# Patient Record
Sex: FEMALE | Race: WHITE | NOT HISPANIC OR LATINO | URBAN - METROPOLITAN AREA
[De-identification: names, ages, dates, MRNs, and addresses within clinical notes are randomized per-mention and may not be internally consistent; named-entity substitution may affect disease eponyms.]

---

## 2017-01-27 ENCOUNTER — OFFICE (OUTPATIENT)
Dept: URBAN - METROPOLITAN AREA CLINIC 64 | Facility: CLINIC | Age: 62
End: 2017-01-27

## 2017-01-27 VITALS
WEIGHT: 158 LBS | HEIGHT: 61 IN | DIASTOLIC BLOOD PRESSURE: 71 MMHG | HEART RATE: 71 BPM | SYSTOLIC BLOOD PRESSURE: 123 MMHG

## 2017-01-27 DIAGNOSIS — K21.9 GASTRO-ESOPHAGEAL REFLUX DISEASE WITHOUT ESOPHAGITIS: ICD-10-CM

## 2017-01-27 DIAGNOSIS — R19.7 DIARRHEA, UNSPECIFIED: ICD-10-CM

## 2017-01-27 PROCEDURE — 99213 OFFICE O/P EST LOW 20 MIN: CPT | Performed by: INTERNAL MEDICINE

## 2017-01-27 RX ORDER — DEXLANSOPRAZOLE 60 MG/1
CAPSULE, DELAYED RELEASE ORAL
Qty: 30 | Refills: 2 | Status: ACTIVE

## 2017-04-25 ENCOUNTER — HOSPITAL ENCOUNTER (OUTPATIENT)
Dept: FAMILY MEDICINE CLINIC | Facility: CLINIC | Age: 62
Setting detail: SPECIMEN
Discharge: HOME | End: 2017-04-25
Attending: FAMILY MEDICINE | Admitting: FAMILY MEDICINE

## 2017-04-25 LAB
ALBUMIN SERPL-MCNC: 4.1 G/DL (ref 3.5–4.8)
ALBUMIN/GLOB SERPL: 1.3 {RATIO} (ref 1–1.7)
ALP SERPL-CCNC: 78 IU/L (ref 32–91)
ALT SERPL-CCNC: 26 IU/L (ref 14–54)
ANION GAP SERPL CALC-SCNC: 15.6 MMOL/L (ref 10–20)
AST SERPL-CCNC: 24 IU/L (ref 15–41)
BASOPHILS # BLD AUTO: 0 10*3/UL (ref 0–0.2)
BASOPHILS NFR BLD AUTO: 0 % (ref 0–2)
BILIRUB SERPL-MCNC: 0.6 MG/DL (ref 0.3–1.2)
BUN SERPL-MCNC: 17 MG/DL (ref 8–20)
BUN/CREAT SERPL: 21.3 (ref 5.4–26.2)
CALCIUM SERPL-MCNC: 9.6 MG/DL (ref 8.9–10.3)
CHLORIDE SERPL-SCNC: 106 MMOL/L (ref 101–111)
CHOLEST SERPL-MCNC: 244 MG/DL
CHOLEST/HDLC SERPL: 4.3 {RATIO}
CONV CO2: 26 MMOL/L (ref 22–32)
CONV LDL CHOLESTEROL DIRECT: 136 MG/DL (ref 0–100)
CONV TOTAL PROTEIN: 7.3 G/DL (ref 6.1–7.9)
CREAT UR-MCNC: 0.8 MG/DL (ref 0.4–1)
DIFFERENTIAL METHOD BLD: (no result)
EOSINOPHIL # BLD AUTO: 0.3 10*3/UL (ref 0–0.3)
EOSINOPHIL # BLD AUTO: 4 % (ref 0–3)
ERYTHROCYTE [DISTWIDTH] IN BLOOD BY AUTOMATED COUNT: 17.6 % (ref 11.5–14.5)
GLOBULIN UR ELPH-MCNC: 3.2 G/DL (ref 2.5–3.8)
GLUCOSE SERPL-MCNC: 93 MG/DL (ref 65–99)
HCT VFR BLD AUTO: 45.4 % (ref 35–49)
HDLC SERPL-MCNC: 57 MG/DL
HGB BLD-MCNC: 14.9 G/DL (ref 12–15)
LDLC/HDLC SERPL: 2.4 {RATIO}
LIPID INTERPRETATION: ABNORMAL
LYMPHOCYTES # BLD AUTO: 2.8 10*3/UL (ref 0.8–4.8)
LYMPHOCYTES NFR BLD AUTO: 36 % (ref 18–42)
MCH RBC QN AUTO: 27.2 PG (ref 26–32)
MCHC RBC AUTO-ENTMCNC: 32.9 G/DL (ref 32–36)
MCV RBC AUTO: 82.7 FL (ref 80–94)
MONOCYTES # BLD AUTO: 0.6 10*3/UL (ref 0.1–1.3)
MONOCYTES NFR BLD AUTO: 8 % (ref 2–11)
NEUTROPHILS # BLD AUTO: 4.1 10*3/UL (ref 2.3–8.6)
NEUTROPHILS NFR BLD AUTO: 52 % (ref 50–75)
NRBC BLD AUTO-RTO: 0 /100{WBCS}
NRBC/RBC NFR BLD MANUAL: 0 10*3/UL
PLATELET # BLD AUTO: 320 10*3/UL (ref 150–450)
PMV BLD AUTO: 7.2 FL (ref 7.4–10.4)
POTASSIUM SERPL-SCNC: 4.6 MMOL/L (ref 3.6–5.1)
RBC # BLD AUTO: 5.49 10*6/UL (ref 4–5.4)
SODIUM SERPL-SCNC: 143 MMOL/L (ref 136–144)
TRIGL SERPL-MCNC: 280 MG/DL
TSH SERPL-ACNC: 4.51 UIU/ML (ref 0.34–5.6)
VIT B12 SERPL-MCNC: 213 PG/ML (ref 180–914)
VLDLC SERPL CALC-MCNC: 51.5 MG/DL
WBC # BLD AUTO: 7.8 10*3/UL (ref 4.5–11.5)

## 2017-05-26 ENCOUNTER — HOSPITAL ENCOUNTER (OUTPATIENT)
Dept: MAMMOGRAPHY | Facility: HOSPITAL | Age: 62
Discharge: HOME | End: 2017-05-26
Attending: FAMILY MEDICINE | Admitting: FAMILY MEDICINE

## 2017-10-03 ENCOUNTER — HOSPITAL ENCOUNTER (OUTPATIENT)
Dept: FAMILY MEDICINE CLINIC | Facility: CLINIC | Age: 62
Setting detail: SPECIMEN
Discharge: HOME | End: 2017-10-03
Attending: FAMILY MEDICINE | Admitting: FAMILY MEDICINE

## 2017-10-03 LAB
ALBUMIN SERPL-MCNC: 4.1 G/DL (ref 3.5–4.8)
ALBUMIN/GLOB SERPL: 1.5 {RATIO} (ref 1–1.7)
ALP SERPL-CCNC: 82 IU/L (ref 32–91)
ALT SERPL-CCNC: 26 IU/L (ref 14–54)
ANION GAP SERPL CALC-SCNC: 12.2 MMOL/L (ref 10–20)
AST SERPL-CCNC: 27 IU/L (ref 15–41)
BILIRUB SERPL-MCNC: 0.2 MG/DL (ref 0.3–1.2)
BUN SERPL-MCNC: 10 MG/DL (ref 8–20)
BUN/CREAT SERPL: 11.1 (ref 5.4–26.2)
CALCIUM SERPL-MCNC: 9.1 MG/DL (ref 8.9–10.3)
CHLORIDE SERPL-SCNC: 109 MMOL/L (ref 101–111)
CHOLEST SERPL-MCNC: 170 MG/DL
CHOLEST/HDLC SERPL: 4.2 {RATIO}
CONV CO2: 26 MMOL/L (ref 22–32)
CONV LDL CHOLESTEROL DIRECT: 71 MG/DL (ref 0–100)
CONV TOTAL PROTEIN: 6.9 G/DL (ref 6.1–7.9)
CREAT UR-MCNC: 0.9 MG/DL (ref 0.4–1)
GLOBULIN UR ELPH-MCNC: 2.8 G/DL (ref 2.5–3.8)
GLUCOSE SERPL-MCNC: 104 MG/DL (ref 65–99)
HDLC SERPL-MCNC: 41 MG/DL
LDLC/HDLC SERPL: 1.7 {RATIO}
LIPID INTERPRETATION: ABNORMAL
POTASSIUM SERPL-SCNC: 4.2 MMOL/L (ref 3.6–5.1)
SODIUM SERPL-SCNC: 143 MMOL/L (ref 136–144)
TRIGL SERPL-MCNC: 330 MG/DL
TSH SERPL-ACNC: 21.47 UIU/ML (ref 0.34–5.6)
VLDLC SERPL CALC-MCNC: 58.6 MG/DL

## 2017-12-22 ENCOUNTER — OFFICE (OUTPATIENT)
Dept: URBAN - METROPOLITAN AREA CLINIC 64 | Facility: CLINIC | Age: 62
End: 2017-12-22

## 2017-12-22 VITALS
HEIGHT: 61 IN | DIASTOLIC BLOOD PRESSURE: 77 MMHG | HEART RATE: 89 BPM | SYSTOLIC BLOOD PRESSURE: 120 MMHG | WEIGHT: 180 LBS

## 2017-12-22 DIAGNOSIS — R14.0 ABDOMINAL DISTENSION (GASEOUS): ICD-10-CM

## 2017-12-22 LAB
IMMUNOGLOBULIN A, QN, SERUM: 201 MG/DL (ref 87–352)
T-TRANSGLUTAMINASE (TTG) IGA: <2 U/ML

## 2017-12-22 PROCEDURE — 99213 OFFICE O/P EST LOW 20 MIN: CPT | Performed by: INTERNAL MEDICINE

## 2017-12-22 RX ORDER — METRONIDAZOLE 250 MG/1
1000 TABLET, FILM COATED ORAL
Qty: 40 | Refills: 0 | Status: COMPLETED
Start: 2017-12-22 | End: 2018-02-15

## 2018-02-15 ENCOUNTER — OFFICE (OUTPATIENT)
Dept: URBAN - METROPOLITAN AREA CLINIC 64 | Facility: CLINIC | Age: 63
End: 2018-02-15

## 2018-02-15 VITALS
WEIGHT: 179 LBS | HEART RATE: 82 BPM | SYSTOLIC BLOOD PRESSURE: 131 MMHG | HEIGHT: 61 IN | DIASTOLIC BLOOD PRESSURE: 83 MMHG

## 2018-02-15 DIAGNOSIS — K21.9 GASTRO-ESOPHAGEAL REFLUX DISEASE WITHOUT ESOPHAGITIS: ICD-10-CM

## 2018-02-15 DIAGNOSIS — R14.0 ABDOMINAL DISTENSION (GASEOUS): ICD-10-CM

## 2018-02-15 PROCEDURE — 99213 OFFICE O/P EST LOW 20 MIN: CPT | Performed by: INTERNAL MEDICINE

## 2018-02-15 RX ORDER — LACTOBACIL 2/BIFIDO 1/S.THERMO 450B CELL
225 PACKET (EA) ORAL
Qty: 60 | Refills: 11 | Status: ACTIVE
Start: 2018-02-15

## 2018-09-11 ENCOUNTER — HOSPITAL ENCOUNTER (OUTPATIENT)
Dept: FAMILY MEDICINE CLINIC | Facility: CLINIC | Age: 63
Setting detail: SPECIMEN
Discharge: HOME | End: 2018-09-11
Attending: FAMILY MEDICINE | Admitting: FAMILY MEDICINE

## 2018-09-11 LAB
ALBUMIN SERPL-MCNC: 3.8 G/DL (ref 3.5–4.8)
ALBUMIN/GLOB SERPL: 1.3 {RATIO} (ref 1–1.7)
ALP SERPL-CCNC: 114 IU/L (ref 32–91)
ALT SERPL-CCNC: 24 IU/L (ref 14–54)
ANION GAP SERPL CALC-SCNC: 14.8 MMOL/L (ref 10–20)
AST SERPL-CCNC: 23 IU/L (ref 15–41)
BASOPHILS # BLD AUTO: 0 10*3/UL (ref 0–0.2)
BASOPHILS NFR BLD AUTO: 1 % (ref 0–2)
BILIRUB SERPL-MCNC: 0.5 MG/DL (ref 0.3–1.2)
BUN SERPL-MCNC: 11 MG/DL (ref 8–20)
BUN/CREAT SERPL: 15.7 (ref 5.4–26.2)
CALCIUM SERPL-MCNC: 8.9 MG/DL (ref 8.9–10.3)
CHLORIDE SERPL-SCNC: 107 MMOL/L (ref 101–111)
CONV CO2: 24 MMOL/L (ref 22–32)
CONV TOTAL PROTEIN: 6.8 G/DL (ref 6.1–7.9)
CREAT UR-MCNC: 0.7 MG/DL (ref 0.4–1)
DIFFERENTIAL METHOD BLD: (no result)
EOSINOPHIL # BLD AUTO: 0.7 10*3/UL (ref 0–0.3)
EOSINOPHIL # BLD AUTO: 12 % (ref 0–3)
ERYTHROCYTE [DISTWIDTH] IN BLOOD BY AUTOMATED COUNT: 17.4 % (ref 11.5–14.5)
GLOBULIN UR ELPH-MCNC: 3 G/DL (ref 2.5–3.8)
GLUCOSE SERPL-MCNC: 102 MG/DL (ref 65–99)
HCT VFR BLD AUTO: 40.3 % (ref 35–49)
HGB BLD-MCNC: 12.5 G/DL (ref 12–15)
LYMPHOCYTES # BLD AUTO: 2.1 10*3/UL (ref 0.8–4.8)
LYMPHOCYTES NFR BLD AUTO: 35 % (ref 18–42)
MCH RBC QN AUTO: 24.3 PG (ref 26–32)
MCHC RBC AUTO-ENTMCNC: 31 G/DL (ref 32–36)
MCV RBC AUTO: 78.5 FL (ref 80–94)
MONOCYTES # BLD AUTO: 0.5 10*3/UL (ref 0.1–1.3)
MONOCYTES NFR BLD AUTO: 8 % (ref 2–11)
NEUTROPHILS # BLD AUTO: 2.6 10*3/UL (ref 2.3–8.6)
NEUTROPHILS NFR BLD AUTO: 44 % (ref 50–75)
NRBC BLD AUTO-RTO: 0 /100{WBCS}
NRBC/RBC NFR BLD MANUAL: 0 10*3/UL
PLATELET # BLD AUTO: 362 10*3/UL (ref 150–450)
PMV BLD AUTO: 7.2 FL (ref 7.4–10.4)
POTASSIUM SERPL-SCNC: 3.8 MMOL/L (ref 3.6–5.1)
RBC # BLD AUTO: 5.13 10*6/UL (ref 4–5.4)
SODIUM SERPL-SCNC: 142 MMOL/L (ref 136–144)
WBC # BLD AUTO: 5.9 10*3/UL (ref 4.5–11.5)

## 2018-09-26 ENCOUNTER — HOSPITAL ENCOUNTER (OUTPATIENT)
Dept: GENERAL RADIOLOGY | Facility: HOSPITAL | Age: 63
Discharge: HOME | End: 2018-09-26
Attending: FAMILY MEDICINE | Admitting: FAMILY MEDICINE

## 2019-06-18 ENCOUNTER — OFFICE VISIT (OUTPATIENT)
Dept: FAMILY MEDICINE CLINIC | Facility: CLINIC | Age: 64
End: 2019-06-18

## 2019-06-18 VITALS — WEIGHT: 192 LBS | BODY MASS INDEX: 36.28 KG/M2

## 2019-06-18 DIAGNOSIS — L50.9 HIVES: Primary | ICD-10-CM

## 2019-06-18 PROBLEM — R53.83 FATIGUE: Status: ACTIVE | Noted: 2018-09-11

## 2019-06-18 PROBLEM — K59.03 DRUG-INDUCED CONSTIPATION: Status: ACTIVE | Noted: 2017-04-04

## 2019-06-18 PROBLEM — IMO0002 DEGENERATION OF INTERVERTEBRAL DISC: Status: ACTIVE | Noted: 2019-06-18

## 2019-06-18 PROCEDURE — 99213 OFFICE O/P EST LOW 20 MIN: CPT | Performed by: FAMILY MEDICINE

## 2019-06-18 PROCEDURE — 96372 THER/PROPH/DIAG INJ SC/IM: CPT | Performed by: FAMILY MEDICINE

## 2019-06-18 RX ORDER — OMEPRAZOLE 40 MG/1
40 CAPSULE, DELAYED RELEASE ORAL DAILY
Refills: 5 | COMMUNITY
Start: 2019-05-28 | End: 2019-09-26

## 2019-06-18 RX ORDER — ATORVASTATIN CALCIUM 40 MG/1
TABLET, FILM COATED ORAL EVERY 24 HOURS
COMMUNITY
Start: 2018-06-08

## 2019-06-18 RX ORDER — METHYLPREDNISOLONE ACETATE 80 MG/ML
80 INJECTION, SUSPENSION INTRA-ARTICULAR; INTRALESIONAL; INTRAMUSCULAR; SOFT TISSUE ONCE
Status: COMPLETED | OUTPATIENT
Start: 2019-06-18 | End: 2019-06-18

## 2019-06-18 RX ORDER — CYANOCOBALAMIN 1000 UG/ML
INJECTION, SOLUTION INTRAMUSCULAR; SUBCUTANEOUS
Refills: 5 | COMMUNITY
Start: 2019-04-22

## 2019-06-18 RX ORDER — LEVOTHYROXINE AND LIOTHYRONINE 57; 13.5 UG/1; UG/1
TABLET ORAL EVERY 24 HOURS
COMMUNITY
Start: 2019-03-22

## 2019-06-18 RX ORDER — PROMETHAZINE HYDROCHLORIDE 25 MG/1
TABLET ORAL EVERY 6 HOURS
COMMUNITY
Start: 2018-09-11

## 2019-06-18 RX ORDER — DEXLANSOPRAZOLE 60 MG/1
CAPSULE, DELAYED RELEASE ORAL
COMMUNITY
Start: 2018-09-11 | End: 2019-09-19 | Stop reason: SDUPTHER

## 2019-06-18 RX ORDER — PROPRANOLOL HYDROCHLORIDE 120 MG/1
120 CAPSULE, EXTENDED RELEASE ORAL DAILY
Refills: 2 | COMMUNITY
Start: 2019-05-15

## 2019-06-18 RX ORDER — VERAPAMIL HYDROCHLORIDE 180 MG/1
CAPSULE, EXTENDED RELEASE ORAL
COMMUNITY
Start: 2018-03-04 | End: 2019-08-01 | Stop reason: SDUPTHER

## 2019-06-18 RX ORDER — PAROXETINE HYDROCHLORIDE 40 MG/1
TABLET, FILM COATED ORAL EVERY 24 HOURS
COMMUNITY
Start: 2018-09-11 | End: 2019-08-01 | Stop reason: SDUPTHER

## 2019-06-18 RX ADMIN — METHYLPREDNISOLONE ACETATE 80 MG: 80 INJECTION, SUSPENSION INTRA-ARTICULAR; INTRALESIONAL; INTRAMUSCULAR; SOFT TISSUE at 16:26

## 2019-06-18 NOTE — PROGRESS NOTES
Subjective   Chief Complaint   Patient presents with   • Urticaria     hives all over x 6/18/19     Omaira Roque is a 63 y.o. female.     Urticaria   This is a new problem. The current episode started yesterday. The problem has been gradually worsening since onset. The affected locations include the scalp, face, lips, neck, abdomen, groin, right axilla, left lower leg, right lower leg and left axilla. The rash is characterized by redness, swelling and itchiness. She was exposed to a new medication (She had Botox one week ago for migraine headaches.). Pertinent negatives include no cough, facial edema, fever, shortness of breath or sore throat. Past treatments include antihistamine. The treatment provided no relief. Her past medical history is significant for allergies.      History reviewed. No pertinent past medical history.  Past Surgical History:   Procedure Laterality Date   • GASTRIC SLEEVE LAPAROSCOPIC  05/29/2019     Allergies   Allergen Reactions   • Ace Inhibitors Unknown (See Comments)   • Amitriptyline Hcl Unknown (See Comments)   • Carbamazepine Unknown (See Comments)   • Cefdinir Shortness Of Breath   • Metronidazole Nausea Only   • Morphine Palpitations   • Pregabalin Other (See Comments)   • Synthroid  [Levothyroxine Sodium] Rash     Social History     Socioeconomic History   • Marital status:      Spouse name: Not on file   • Number of children: Not on file   • Years of education: Not on file   • Highest education level: Not on file   Tobacco Use   • Smoking status: Never Smoker   • Smokeless tobacco: Never Used   Substance and Sexual Activity   • Alcohol use: No     Frequency: Never     Social History     Tobacco Use   Smoking Status Never Smoker   Smokeless Tobacco Never Used       family history is not on file.  Current Outpatient Medications on File Prior to Visit   Medication Sig Dispense Refill   • atorvastatin (LIPITOR) 40 MG tablet Daily.     • BREO ELLIPTA 200-25 MCG/INH inhaler  "Inhale 1 puff Daily.  3   • cyanocobalamin 1000 MCG/ML injection INJECT 1 ML INTRAMUSCULARLY ONCE A WEEK  5   • omeprazole (priLOSEC) 40 MG capsule Take 40 mg by mouth Daily.  5   • PARoxetine (PAXIL) 40 MG tablet Daily.     • promethazine (PHENERGAN) 25 MG tablet Every 6 (Six) Hours.     • propranolol LA (INDERAL LA) 120 MG 24 hr capsule Take 120 mg by mouth Daily.  2   • Thyroid (ARMOUR THYROID) 90 MG PO tablet Daily.     • verapamil PM (VERELAN PM) 180 MG 24 hr capsule VERAPAMIL HCL  MG [id number]     • dexlansoprazole (DEXILANT) 60 MG capsule DEXILANT 60 MG CPDR     • Syringe/Needle, Disp, (BD ECLIPSE SYRINGE) 25G X 1\" 3 ML misc BD ECLIPSE SYRINGE 25G X 1\" 3 ML       No current facility-administered medications on file prior to visit.      Patient Active Problem List   Diagnosis   • Anaclitic depression   • Anemia, B12 deficiency   • Anxiety state   • Benign neoplasm of pineal gland (CMS/HCC)   • Carpal tunnel syndrome   • Cervical stenosis of spinal canal   • Degeneration of intervertebral disc   • Dependent edema   • Dermatitis   • Diabetes mellitus (CMS/HCC)   • Drug-induced constipation   • Encounter for long-term (current) use of other medications   • Encounter for general adult medical examination without abnormal findings   • Fatigue   • Female climacteric state   • Hay fever   • Hives       The following portions of the patient's history were reviewed and updated as appropriate: allergies, current medications, past family history, past medical history, past social history, past surgical history and problem list.    Review of Systems   Constitutional: Negative for chills and fever.   HENT: Negative for sinus pressure and sore throat.    Eyes: Negative for blurred vision.   Respiratory: Negative for cough and shortness of breath.    Cardiovascular: Negative for chest pain and palpitations.   Gastrointestinal: Negative for abdominal pain.   Endocrine: Negative for polyuria.   Skin: Negative for rash. "   Allergic/Immunologic: Positive for environmental allergies.   Neurological: Negative for dizziness and headache.   Hematological: Negative for adenopathy.   Psychiatric/Behavioral: Negative for depressed mood.       Objective   Wt 87.1 kg (192 lb)   BMI 36.28 kg/m²   Physical Exam   Constitutional: She appears well-developed and well-nourished.   HENT:   Head: Normocephalic and atraumatic.   Eyes: Pupils are equal, round, and reactive to light.   Neck: Normal range of motion. Neck supple.   Cardiovascular: Normal rate and regular rhythm.   Pulmonary/Chest: Effort normal and breath sounds normal.   Skin: Rash noted. Rash is urticarial.              Assessment/Plan   Problems Addressed this Visit        Musculoskeletal and Integument    Hives - Primary    Relevant Medications    methylPREDNISolone acetate (DEPO-medrol) injection 80 mg (Completed) (Start on 6/18/2019  5:25 PM)          Omaira was seen today for urticaria.    Diagnoses and all orders for this visit:    Hives  Comments:  Unsure if this is from Botox one week ago or something else. Treat with Benadryl and Zantac otc and IM Depomedrol here.   Orders:  -     methylPREDNISolone acetate (DEPO-medrol) injection 80 mg

## 2019-06-21 ENCOUNTER — TELEPHONE (OUTPATIENT)
Dept: FAMILY MEDICINE CLINIC | Facility: CLINIC | Age: 64
End: 2019-06-21

## 2019-06-21 NOTE — TELEPHONE ENCOUNTER
Continue with same medicines as discussed at her last office visit.  If symptoms worsen over the weekend go to the ER.

## 2019-07-01 RX ORDER — FUROSEMIDE 20 MG/1
TABLET ORAL
Qty: 90 TABLET | Refills: 1 | Status: SHIPPED | OUTPATIENT
Start: 2019-07-01

## 2019-08-02 RX ORDER — PAROXETINE HYDROCHLORIDE 40 MG/1
TABLET, FILM COATED ORAL
Qty: 90 TABLET | Refills: 3 | Status: SHIPPED | OUTPATIENT
Start: 2019-08-02

## 2019-08-02 RX ORDER — VERAPAMIL HYDROCHLORIDE 180 MG/1
CAPSULE, EXTENDED RELEASE ORAL
Qty: 90 CAPSULE | Refills: 3 | Status: SHIPPED | OUTPATIENT
Start: 2019-08-02

## 2019-09-26 RX ORDER — DEXLANSOPRAZOLE 60 MG/1
CAPSULE, DELAYED RELEASE ORAL
Qty: 90 CAPSULE | Refills: 3 | Status: SHIPPED | OUTPATIENT
Start: 2019-09-26

## 2019-10-02 ENCOUNTER — TRANSCRIBE ORDERS (OUTPATIENT)
Dept: ADMINISTRATIVE | Facility: HOSPITAL | Age: 64
End: 2019-10-02

## 2019-10-02 DIAGNOSIS — Z12.31 SCREENING MAMMOGRAM FOR HIGH-RISK PATIENT: Primary | ICD-10-CM

## 2019-10-08 ENCOUNTER — HOSPITAL ENCOUNTER (OUTPATIENT)
Dept: MAMMOGRAPHY | Facility: HOSPITAL | Age: 64
Discharge: HOME | End: 2019-10-08
Admitting: NURSE PRACTITIONER

## 2019-10-08 DIAGNOSIS — Z12.31 SCREENING MAMMOGRAM FOR HIGH-RISK PATIENT: ICD-10-CM

## 2019-10-08 PROCEDURE — 77067 SCR MAMMO BI INCL CAD: CPT

## 2019-10-08 PROCEDURE — 77063 BREAST TOMOSYNTHESIS BI: CPT

## 2020-08-20 ENCOUNTER — TRANSCRIBE ORDERS (OUTPATIENT)
Dept: ADMINISTRATIVE | Facility: HOSPITAL | Age: 65
End: 2020-08-20

## 2020-08-20 DIAGNOSIS — Z12.31 VISIT FOR SCREENING MAMMOGRAM: Primary | ICD-10-CM

## 2020-09-09 RX ORDER — VERAPAMIL HYDROCHLORIDE 180 MG/1
CAPSULE, EXTENDED RELEASE ORAL
Qty: 90 CAPSULE | Refills: 3 | OUTPATIENT
Start: 2020-09-09

## 2020-10-09 ENCOUNTER — HOSPITAL ENCOUNTER (OUTPATIENT)
Dept: MAMMOGRAPHY | Facility: HOSPITAL | Age: 65
Discharge: HOME | End: 2020-10-09
Admitting: NURSE PRACTITIONER

## 2020-10-09 DIAGNOSIS — Z12.31 VISIT FOR SCREENING MAMMOGRAM: ICD-10-CM

## 2020-10-09 PROCEDURE — 77063 BREAST TOMOSYNTHESIS BI: CPT

## 2020-10-09 PROCEDURE — 77067 SCR MAMMO BI INCL CAD: CPT

## 2020-11-08 RX ORDER — DEXLANSOPRAZOLE 60 MG/1
CAPSULE, DELAYED RELEASE ORAL
Qty: 90 CAPSULE | Refills: 3 | OUTPATIENT
Start: 2020-11-08

## 2021-10-11 ENCOUNTER — TRANSCRIBE ORDERS (OUTPATIENT)
Dept: ADMINISTRATIVE | Facility: HOSPITAL | Age: 66
End: 2021-10-11

## 2021-10-11 DIAGNOSIS — Z12.31 SCREENING MAMMOGRAM, ENCOUNTER FOR: Primary | ICD-10-CM

## 2021-10-12 ENCOUNTER — HOSPITAL ENCOUNTER (OUTPATIENT)
Dept: MAMMOGRAPHY | Facility: HOSPITAL | Age: 66
Discharge: HOME | End: 2021-10-12
Admitting: NURSE PRACTITIONER

## 2021-10-12 DIAGNOSIS — Z12.31 SCREENING MAMMOGRAM, ENCOUNTER FOR: ICD-10-CM

## 2021-10-12 PROCEDURE — 77063 BREAST TOMOSYNTHESIS BI: CPT

## 2021-10-12 PROCEDURE — 77067 SCR MAMMO BI INCL CAD: CPT

## 2022-10-03 ENCOUNTER — TRANSCRIBE ORDERS (OUTPATIENT)
Dept: ADMINISTRATIVE | Facility: HOSPITAL | Age: 67
End: 2022-10-03

## 2022-10-03 DIAGNOSIS — Z12.31 SCREENING MAMMOGRAM, ENCOUNTER FOR: Primary | ICD-10-CM

## 2022-10-13 ENCOUNTER — HOSPITAL ENCOUNTER (OUTPATIENT)
Dept: MAMMOGRAPHY | Facility: HOSPITAL | Age: 67
Discharge: HOME | End: 2022-10-13
Admitting: FAMILY MEDICINE

## 2022-10-13 DIAGNOSIS — Z12.31 SCREENING MAMMOGRAM, ENCOUNTER FOR: ICD-10-CM

## 2022-10-13 PROCEDURE — 77063 BREAST TOMOSYNTHESIS BI: CPT

## 2022-10-13 PROCEDURE — 77067 SCR MAMMO BI INCL CAD: CPT

## 2023-10-19 NOTE — PROGRESS NOTES
"CHIEF COMPLAINT  Neck, back, hip, knee, head pain      Subjective   Omaira Roque is a 67 y.o. female who was referred by  Pat Dumont APRN to our pain management clinic for consultation, evaluation and treatment of neck pain. History of chronic neck pain with multiple surgeries in the past with acutely worsened pain recently 3 months ago with new onset of rash (R arm and R chest) and swelling that comes up on the right side of her neck, also have some fuzziness in vision (was told she has dry eyes, scheduled to see next week).  She was started on muscle relaxants but it caused syncope.  Also history of migraines with good control with Emgality and Ubrelvy, but not since last 3 months. MRI had been denied by insurance due to no PT in last 6 months.  Her last physical therapy was 1 year ago which actually worsened her pain that she refused to go back to physical therapy.  Patient has significant history of depression.  Patient states that she was on a lot of pain medications in the past and decided to stop them all of a sudden.  She was also admitted to mental facility for suicidal ideations several years ago.  Depression is well controlled currently and denies any suicidal ideations.    Neck pain is 5/10 on VAS, at maximum is 9/10. Pain is locking up, dull, achy,  in nature. Pain is referred right occipital region. \"Feels like a knot and  it increase in size with increase headaches.\"The pain is intermittent. The pain is improved by ice pack. The pain is worse with turning head. Chronic migraines, but recent headaches feel different.  Right-sided headaches starting in occipital region/base of neck and radiating to top of her head consistent with occipital neuralgia.    Lower back pain is 5/10 on VAS, at maximum is 10/10. Pain is sharp, shooting, and stabbing in nature. Pain is referred left hip, left poseriolateral knee. The pain is constant. The pain is improved by rest, back brace. The pain is worse with " "walking, standing, taking stairs. Can only do 13 stairs at a time, can't do stairs, can not walk more than a block       PHQ-9-20  SOAPP- 4  [place] back disability scale - 89    PMH:   Gastric sleeve, bilateral carpal tunnel release, migraines,  rotator cuff left side, history of epidural injection and cervical spine, history of spinal stimulator placed and removed- had it for 3 months - \"had shocks in back and had to be removed\" - sounds like a trial??, cervical surgery X4 (cadaver bones X2, hip bone X1, fusion - 15 years ago), hx of cataract surgery .    Current Medications:   Lyrica 300 mg daily   Savella 100 mg BID  Emgality  Propanolol  Uberlvy      Past Medications:    Past Modalities:  TENS:       no          Physical Therapy Within The Last 6 Months     Yes - 1 year ago - PT made her pain worse.   Psychotherapy     no  Massage Therapy      no    Patient Complains Of:  Uro-Fecal Incontinence Yes - Chronic fecal incontinence - seen at [facility] - recommended pelvic floor exercises with PT - non consistent with PT; s/p interstim  Weight Gain/Loss  no  Fever/Chills   no  Weakness   no      PEG Assessment   What number best describes your pain on average in the past week?7  What number best describes how, during the past week, pain has interfered with your enjoyment of life?8  What number best describes how, during the past week, pain has interfered with your general activity?  8    Headaches began: 50 years ago after birth of a child   Headache Severity at Worst: 10/10  Headache Location: right occipital region, wraps around head   Headache Frequency: daily    Headache Duration: 18-20 hrs   Headache onset:   Headache Character: vision goes away, throbbing  Headache Aura: starts  with dull, ache   Associated Symptoms of nausea, emesis, photophobia, phonophobia  Associated Autonomic Symptoms:   Associated Visual Changes: yes, vision changes   Alleviating Factors: medications   Additional Exacerbating Factors: " light, sound  History of Significant Head Injury: none  Last Eyeglasses / Contacts Prescription Check: 1 year ago  FH of Headache: father   FH of Brain Tumor: none  FH of Brain Aneurysm: aunt  Three regular meals each day: 2   Sleep: 7 hrs   Caffeine Use: soda  OTC NSAID Use: tylenol  Past Prescription Headache Medications: Botox - didn't help   Current Prescription Headache Medications: Emgality, Ubervly, Lyrica, propanolol, verapamil      PHQ-9 Depression Screening  Little interest or pleasure in doing things? 3-->nearly every day   Feeling down, depressed, or hopeless? 3-->nearly every day   Trouble falling or staying asleep, or sleeping too much? 3-->nearly every day   Feeling tired or having little energy? 3-->nearly every day   Poor appetite or overeating? 0-->not at all   Feeling bad about yourself - or that you are a failure or have let yourself or your family down? 3-->nearly every day   Trouble concentrating on things, such as reading the newspaper or watching television? 2-->more than half the days   Moving or speaking so slowly that other people could have noticed? Or the opposite - being so fidgety or restless that you have been moving around a lot more than usual? 1-->several days   Thoughts that you would be better off dead, or of hurting yourself in some way? 2-->more than half the days   PHQ-9 Total Score 20   If you checked off any problems, how difficult have these problems made it for you to do your work, take care of things at home, or get along with other people? extremely difficult            Current Outpatient Medications:     atorvastatin (LIPITOR) 40 MG tablet, Daily., Disp: , Rfl:     BREO ELLIPTA 200-25 MCG/INH inhaler, Inhale 1 puff Daily., Disp: , Rfl: 3    buPROPion XL (WELLBUTRIN XL) 300 MG 24 hr tablet, Take 1 tablet by mouth Daily., Disp: , Rfl:     cyanocobalamin 1000 MCG/ML injection, INJECT 1 ML INTRAMUSCULARLY ONCE A WEEK, Disp: , Rfl: 5    DEXILANT 60 MG capsule, TAKE 1 CAPSULE  "BY MOUTH EVERY DAY, Disp: 90 capsule, Rfl: 3    dicyclomine (BENTYL) 10 MG capsule, TAKE ONE CAPSULE BY MOUTH FOUR TIMES DAILY AS NEEDED, Disp: , Rfl:     famotidine (PEPCID) 40 MG tablet, , Disp: , Rfl:     furosemide (LASIX) 20 MG tablet, TAKE 1 TABLET BY MOUTH EVERY DAY, Disp: 90 tablet, Rfl: 1    PARoxetine (PAXIL) 40 MG tablet, TAKE 1 TABLET BY MOUTH EVERY DAY, Disp: 90 tablet, Rfl: 3    pregabalin (LYRICA) 300 MG capsule, , Disp: , Rfl:     promethazine (PHENERGAN) 25 MG tablet, Every 6 (Six) Hours., Disp: , Rfl:     propranolol LA (INDERAL LA) 120 MG 24 hr capsule, Take 1 capsule by mouth Daily., Disp: , Rfl: 2    raNITIdine (ZANTAC) 150 MG capsule, Take 1 capsule by mouth., Disp: , Rfl:     Savella 100 MG tablet, Take 1 tablet by mouth Every 12 (Twelve) Hours., Disp: , Rfl:     Syringe/Needle, Disp, (BD ECLIPSE SYRINGE) 25G X 1\" 3 ML misc, BD ECLIPSE SYRINGE 25G X 1\" 3 ML, Disp: , Rfl:     Thyroid (ARMOUR THYROID) 90 MG PO tablet, Daily., Disp: , Rfl:     tiZANidine (ZANAFLEX) 4 MG tablet, , Disp: , Rfl:     Ubrelvy 100 MG tablet, TAKE 1 TABLET BY MOUTH AS NEEDED FOR MIGRAINE IF NEEDED THEN TAKE 2ND DOSE AT LEAST 2 HOURS AFTER FIRST DOSE *MAX 2 TABS/24 HOURS*, Disp: , Rfl:     verapamil PM (VERELAN PM) 180 MG 24 hr capsule, TAKE 1 CAPSULE BY MOUTH EVERY DAY, Disp: 90 capsule, Rfl: 3    The following portions of the patient's history were reviewed and updated as appropriate: allergies, current medications, past family history, past medical history, past social history, past surgical history, and problem list.      REVIEW OF PERTINENT MEDICAL DATA    Past Medical History:   Diagnosis Date    Back pain     Hip pain     Neck pain      Past Surgical History:   Procedure Laterality Date    GASTRIC SLEEVE LAPAROSCOPIC  05/29/2019    HYSTERECTOMY      REDUCTION MAMMAPLASTY Bilateral 2005     Family History   Problem Relation Age of Onset    Dementia Mother     Heart attack Father      Social History "     Socioeconomic History    Marital status:    Tobacco Use    Smoking status: Never    Smokeless tobacco: Never   Substance and Sexual Activity    Alcohol use: No    Drug use: Never    Sexual activity: Defer         Review of Systems   Gastrointestinal:  Positive for abdominal pain, constipation and diarrhea.   Musculoskeletal:  Positive for arthralgias, back pain, joint swelling, neck pain and neck stiffness.   Neurological:  Positive for headaches.         Vitals:    10/23/23 0816   BP: 135/83   Pulse: 89   Resp: 16   SpO2: 98%   PainSc:   7         Objective   Physical Exam  Neck:     Musculoskeletal:        Legs:          Imaging Reviewed:    CT soft tissue neck  - No acute process    CT head--9/20/2023 negative    X-ray lumbar spine-2022  - Osteophyte to the lumbar level largest at L1-2,  - Moderate to severe disc space narrowing at L1-2, mild narrowing at L2-3  - Moderate to severe facet arthritis L4-5, L5-S1.    Assessment:    1. Occipital neuralgia, unspecified laterality    2. DDD (degenerative disc disease), lumbar    3. Lumbar spondylosis    4. Fibromyalgia    5. Cervical spondylosis         Plan:   1.  Defer UDS for now.  Not a good candidate for opioids.  2. We discussed trying a course of formal physical therapy.  Physical therapy can help strengthen and stretch the muscles around the joints. Continue to be as active as possible. Start physical therapy as it will help generalized pain and follow up with HEP.  Prescription sent on 10/23/2023 for [facility] rehab.  3.  Patient does have significant facet arthritis and lower back and also has radicular pain in the L4, 5 dermatome.  SLR positive on the left side.  We will obtain lumbar MRI to evaluate her lower back further and rule out any significant stenosis.  May consider interventional procedures after reviewing MRI.  4.  Patient had multiple surgeries in cervical spine previously and has increased neck pain for last 3 months.  We will obtain  cervical MRI to evaluate her pain further.  5.  Patient has history of migraines but new onset of headaches are different than her regular migraines. Patient has pain in the head with referred pain on the top of the head. Right Occipital tenderness present. Discussed right greater and lesser occipital nerve block. Discussed the possibility of infection, bleeding, nerve damage, headache, increased pain. Patient understands and agrees.   6.  She had benefited from back brace in the past.  We will prescribe a back brace.  Advised patient to only use it for prolonged walking and very intermittently has using back brace can weaken lower back muscles.  Patient understands and agrees with the plan.  7.  Patient has InterStim and is to call [employer] before getting cervical and lumbar MRI as there is conditional MRI compatibility with this stimulator.  Patient will call [employer].      [facility] for injection and then 2 week follow up and Francis [place]henry Vallejo DO  Pain Management   [facility]     Greater than 60 minutes was spent with the patient, reviewing records, reviewing images, performing the exam, discussing diagnosis and further treatment options, etc., and greater than 50% was spent on education      INSPECT REPORT    As part of the patient's treatment plan, I may be prescribing controlled substances. The patient has been made aware of appropriate use of such medications, including potential risk of somnolence, limited ability to drive and/or work safely, and the potential for dependence or overdose. It has also been made clear that these medications are for use by this patient only, without concomitant use of alcohol or other substances unless prescribed.     Patient has completed prescribing agreement detailing terms of continued prescribing of controlled substances, including monitoring INSPECT reports, urine drug screening, and pill counts if necessary. The patient is aware that inappropriate use will  results in cessation of prescribing such medications.    INSPECT report has been reviewed and scanned into the patient's chart.      EMR Dragon/Transcription Disclaimer:   Much of this encounter note is an electronic transcription/translation of spoken language to printed text. The electronic translation of spoken language may permit erroneous, or at times, nonsensical words or phrases to be inadvertently transcribed; Although I have reviewed the note for such errors, some may still exist.

## 2023-10-23 ENCOUNTER — OFFICE VISIT (OUTPATIENT)
Dept: PAIN MEDICINE | Facility: CLINIC | Age: 68
End: 2023-10-23
Payer: MEDICARE

## 2023-10-23 ENCOUNTER — PATIENT ROUNDING (BHMG ONLY) (OUTPATIENT)
Dept: PAIN MEDICINE | Facility: CLINIC | Age: 68
End: 2023-10-23
Payer: MEDICARE

## 2023-10-23 ENCOUNTER — TELEPHONE (OUTPATIENT)
Dept: PAIN MEDICINE | Facility: CLINIC | Age: 68
End: 2023-10-23
Payer: MEDICARE

## 2023-10-23 VITALS
SYSTOLIC BLOOD PRESSURE: 135 MMHG | RESPIRATION RATE: 16 BRPM | HEART RATE: 89 BPM | OXYGEN SATURATION: 98 % | DIASTOLIC BLOOD PRESSURE: 83 MMHG

## 2023-10-23 DIAGNOSIS — M47.812 CERVICAL SPONDYLOSIS: ICD-10-CM

## 2023-10-23 DIAGNOSIS — M47.816 LUMBAR SPONDYLOSIS: ICD-10-CM

## 2023-10-23 DIAGNOSIS — M54.81 OCCIPITAL NEURALGIA, UNSPECIFIED LATERALITY: Primary | ICD-10-CM

## 2023-10-23 DIAGNOSIS — M79.7 FIBROMYALGIA: ICD-10-CM

## 2023-10-23 DIAGNOSIS — M51.36 DDD (DEGENERATIVE DISC DISEASE), LUMBAR: ICD-10-CM

## 2023-10-23 PROCEDURE — 1125F AMNT PAIN NOTED PAIN PRSNT: CPT | Performed by: STUDENT IN AN ORGANIZED HEALTH CARE EDUCATION/TRAINING PROGRAM

## 2023-10-23 PROCEDURE — 99205 OFFICE O/P NEW HI 60 MIN: CPT | Performed by: STUDENT IN AN ORGANIZED HEALTH CARE EDUCATION/TRAINING PROGRAM

## 2023-10-23 PROCEDURE — 1159F MED LIST DOCD IN RCRD: CPT | Performed by: STUDENT IN AN ORGANIZED HEALTH CARE EDUCATION/TRAINING PROGRAM

## 2023-10-23 PROCEDURE — 1160F RVW MEDS BY RX/DR IN RCRD: CPT | Performed by: STUDENT IN AN ORGANIZED HEALTH CARE EDUCATION/TRAINING PROGRAM

## 2023-10-23 RX ORDER — TIZANIDINE 4 MG/1
TABLET ORAL
COMMUNITY

## 2023-10-23 RX ORDER — DICYCLOMINE HYDROCHLORIDE 10 MG/1
CAPSULE ORAL
COMMUNITY
Start: 2023-10-19

## 2023-10-23 RX ORDER — FAMOTIDINE 40 MG/1
TABLET, FILM COATED ORAL
COMMUNITY

## 2023-10-23 RX ORDER — PREGABALIN 300 MG/1
CAPSULE ORAL
COMMUNITY
Start: 2023-10-09

## 2023-10-23 RX ORDER — BUPROPION HYDROCHLORIDE 300 MG/1
1 TABLET ORAL DAILY
COMMUNITY
Start: 2023-10-13

## 2023-10-23 RX ORDER — RANITIDINE 150 MG/1
150 CAPSULE ORAL
COMMUNITY

## 2023-10-23 RX ORDER — MILNACIPRAN HYDROCHLORIDE 100 MG/1
1 TABLET, FILM COATED ORAL EVERY 12 HOURS SCHEDULED
COMMUNITY
Start: 2023-10-09

## 2023-10-23 RX ORDER — UBROGEPANT 100 MG/1
TABLET ORAL
COMMUNITY
Start: 2023-10-13

## 2023-10-23 NOTE — PROGRESS NOTES
October 23, 2023    Hello, may I speak with Omaira Roque?    My name is Nancy    I am  with [facility]  [address]  [place] [address].    Before we get started may I verify your date of birth? 1955    I am calling to officially welcome you to our practice and ask about your recent visit. Is this a good time to talk? yesyes    Tell me about your visit with us. What things went well?  All questions answered       We're always looking for ways to make our patients' experiences even better. Do you have recommendations on ways we may improve?  nono    Overall were you satisfied with your first visit to our practice? yesyes       I appreciate you taking the time to speak with me today. Is there anything else I can do for you? nono      Thank you, and have a great day.

## 2023-10-31 ENCOUNTER — PROCEDURE VISIT (OUTPATIENT)
Dept: PAIN MEDICINE | Facility: CLINIC | Age: 68
End: 2023-10-31
Payer: MEDICARE

## 2023-10-31 VITALS
DIASTOLIC BLOOD PRESSURE: 88 MMHG | RESPIRATION RATE: 16 BRPM | OXYGEN SATURATION: 97 % | SYSTOLIC BLOOD PRESSURE: 130 MMHG | HEART RATE: 73 BPM

## 2023-10-31 DIAGNOSIS — M54.81 OCCIPITAL NEURALGIA, UNSPECIFIED LATERALITY: Primary | ICD-10-CM

## 2023-10-31 RX ORDER — METHYLPREDNISOLONE ACETATE 40 MG/ML
40 INJECTION, SUSPENSION INTRA-ARTICULAR; INTRALESIONAL; INTRAMUSCULAR; SOFT TISSUE ONCE
Status: COMPLETED | OUTPATIENT
Start: 2023-10-31 | End: 2023-10-31

## 2023-10-31 RX ORDER — BUPIVACAINE HYDROCHLORIDE 2.5 MG/ML
5 INJECTION, SOLUTION INFILTRATION; PERINEURAL ONCE
Status: COMPLETED | OUTPATIENT
Start: 2023-10-31 | End: 2023-10-31

## 2023-10-31 RX ADMIN — BUPIVACAINE HYDROCHLORIDE 5 ML: 2.5 INJECTION, SOLUTION INFILTRATION; PERINEURAL at 10:50

## 2023-10-31 RX ADMIN — METHYLPREDNISOLONE ACETATE 40 MG: 40 INJECTION, SUSPENSION INTRA-ARTICULAR; INTRALESIONAL; INTRAMUSCULAR; SOFT TISSUE at 10:51

## 2023-10-31 NOTE — PROGRESS NOTES
H and P reviewed from previous visit and no changes to patient's clinical presentation. Will proceed with procedure as planned.     David Vallejo DO  Pain Management   [facility]       Pre op diagnosis: RIGHT occipital neuralgia.     The patient's chart was reviewed.  After obtaining written informed consent, the patient was placed in a sitting position with the cervical spine flexed and the forehead on a padded bedside table.  The RIGHT occipital artery was palpated at the level of the superior nuchal ridge.  The area was prepped with antiseptic solution.  A 27 gauge 1 1/2 inch needle is inserted just medial to the artery and advanced perpendicular until the needle approached the periostium of the underlying occipital bone.  Then, the needle was redirected superiorly.  After gentle aspiration, 2 ml of the solution was injected in a fanlike manner.  The RIGHT lesser occipital nerve was blocked by redirecting the needle laterally and slightly inferiorly, 2 ml of the solution was injected in a fanlike manner.  A mixture of 0.25% marcaine with 40 mg of depo medrol was injected slowly.  The patient tolerated the procedure well.  The needle was flushed and withdrawn, and a Band-Aid was applied.      David Vallejo DO  Pain Management   [facility]

## 2023-11-10 ENCOUNTER — TRANSCRIBE ORDERS (OUTPATIENT)
Dept: ADMINISTRATIVE | Facility: HOSPITAL | Age: 68
End: 2023-11-10
Payer: MEDICARE

## 2023-11-10 DIAGNOSIS — Z12.31 VISIT FOR SCREENING MAMMOGRAM: Primary | ICD-10-CM

## 2023-11-20 NOTE — PROGRESS NOTES
"  Subjective   Omaira Roque is a 67 y.o. female is here for follow up for neck pain and lower back pain. Patient was last seen for right occipital nerve block.    On last visit:     Neck pain is 4/10 on VAS, at maximum is 6/10. Pain is locking up, dull, achy,  in nature. Pain is referred right occipital region. \"Feels like a knot and  it increase in size with increase headaches.\"The pain is intermittent. The pain is improved by ice pack and R occipital Nerve block. The pain is worse with turning head. Chronic migraines, but recent headaches feel different.  Right-sided headaches starting in occipital region/base of neck and radiating to top of her head consistent with occipital neuralgia.     Lower back pain is 8/10 on VAS, at maximum is 10/10. Pain is sharp, shooting, and stabbing in nature. Pain is referred left hip, left poseriolateral knee. The pain is constant. The pain is improved by rest, back brace. The pain is worse with walking, standing, taking stairs. Can only do 13 stairs at a time, can't do stairs, can not walk more than a block.  Her main complaint is axial lower back pain.    Previous Injection:   10/31/2023-right occipital nerve block- 75% pain relief. Headaches are much improved.     Hx:  Referred by  Pat Dumont APRN for neck pain. History of chronic neck pain with multiple surgeries in the past with acutely worsened pain recently 3 months ago with new onset of rash (R arm and R chest) and swelling that comes up on the right side of her neck, also have some fuzziness in vision (was told she has dry eyes, scheduled to see next week).  She was started on muscle relaxants but it caused syncope.  Also history of migraines with good control with Emgality and Ubrelvy, but not since last 3 months. MRI had been denied by insurance due to no PT in last 6 months.  Her last physical therapy was 1 year ago which actually worsened her pain that she refused to go back to physical therapy.  Patient has " "significant history of depression.  Patient states that she was on a lot of pain medications in the past and decided to stop them all of a sudden.  She was also admitted to mental facility for suicidal ideations several years ago.  Depression is well controlled currently and denies any suicidal ideations.              PHQ-9-20  SOAPP- 4  [place] back disability scale - 89     PMH:   Gastric sleeve, bilateral carpal tunnel release, migraines,  rotator cuff left side, history of epidural injection and cervical spine, history of spinal stimulator placed and removed- had it for 3 months - \"had shocks in back and had to be removed\" - sounds like a trial??, cervical surgery X4 (cadaver bones X2, hip bone X1, fusion - 15 years ago), hx of cataract surgery .     Current Medications:   Lyrica 300 mg daily   Savella 100 mg BID  Emgality  Propanolol  Uberlvy        Past Medications:     Past Modalities:  TENS:                                                                          no                                                  Physical Therapy Within The Last 6 Months              Yes - 1 year ago - PT made her pain worse.   Psychotherapy                                                            no  Massage Therapy                                                       no     Patient Complains Of:  Uro-Fecal Incontinence          Yes - Chronic fecal incontinence - seen at [facility] - recommended pelvic floor exercises with PT - non consistent with PT; s/p interstim  Weight Gain/Loss                   no  Fever/Chills                             no  Weakness                               no        PEG Assessment   What number best describes your pain on average in the past week?7  What number best describes how, during the past week, pain has interfered with your enjoyment of life?8  What number best describes how, during the past week, pain has interfered with your general activity?  8     Headaches began: 50 years ago " after birth of a child   Headache Severity at Worst: 10/10  Headache Location: right occipital region, wraps around head   Headache Frequency: daily    Headache Duration: 18-20 hrs   Headache onset:   Headache Character: vision goes away, throbbing  Headache Aura: starts  with dull, ache   Associated Symptoms of nausea, emesis, photophobia, phonophobia  Associated Autonomic Symptoms:   Associated Visual Changes: yes, vision changes   Alleviating Factors: medications   Additional Exacerbating Factors: light, sound  History of Significant Head Injury: none  Last Eyeglasses / Contacts Prescription Check: 1 year ago  FH of Headache: father   FH of Brain Tumor: none  FH of Brain Aneurysm: aunt  Three regular meals each day: 2   Sleep: 7 hrs   Caffeine Use: soda  OTC NSAID Use: tylenol  Past Prescription Headache Medications: Botox - didn't help   Current Prescription Headache Medications: Emgality, Ubervly, Lyrica, propanolol, verapamil              Current Outpatient Medications:     atorvastatin (LIPITOR) 40 MG tablet, Daily., Disp: , Rfl:     BREO ELLIPTA 200-25 MCG/INH inhaler, Inhale 1 puff Daily., Disp: , Rfl: 3    buPROPion XL (WELLBUTRIN XL) 300 MG 24 hr tablet, Take 1 tablet by mouth Daily., Disp: , Rfl:     cyanocobalamin 1000 MCG/ML injection, INJECT 1 ML INTRAMUSCULARLY ONCE A WEEK, Disp: , Rfl: 5    DEXILANT 60 MG capsule, TAKE 1 CAPSULE BY MOUTH EVERY DAY, Disp: 90 capsule, Rfl: 3    dicyclomine (BENTYL) 10 MG capsule, TAKE ONE CAPSULE BY MOUTH FOUR TIMES DAILY AS NEEDED, Disp: , Rfl:     famotidine (PEPCID) 40 MG tablet, , Disp: , Rfl:     furosemide (LASIX) 20 MG tablet, TAKE 1 TABLET BY MOUTH EVERY DAY, Disp: 90 tablet, Rfl: 1    PARoxetine (PAXIL) 40 MG tablet, TAKE 1 TABLET BY MOUTH EVERY DAY, Disp: 90 tablet, Rfl: 3    pregabalin (LYRICA) 300 MG capsule, , Disp: , Rfl:     promethazine (PHENERGAN) 25 MG tablet, Every 6 (Six) Hours., Disp: , Rfl:     propranolol LA (INDERAL LA) 120 MG 24 hr capsule,  "Take 1 capsule by mouth Daily., Disp: , Rfl: 2    raNITIdine (ZANTAC) 150 MG capsule, Take 1 capsule by mouth., Disp: , Rfl:     Savella 100 MG tablet, Take 1 tablet by mouth Every 12 (Twelve) Hours., Disp: , Rfl:     Syringe/Needle, Disp, (BD ECLIPSE SYRINGE) 25G X 1\" 3 ML misc, BD ECLIPSE SYRINGE 25G X 1\" 3 ML, Disp: , Rfl:     Thyroid (ARMOUR THYROID) 90 MG PO tablet, Daily., Disp: , Rfl:     tiZANidine (ZANAFLEX) 4 MG tablet, , Disp: , Rfl:     Ubrelvy 100 MG tablet, TAKE 1 TABLET BY MOUTH AS NEEDED FOR MIGRAINE IF NEEDED THEN TAKE 2ND DOSE AT LEAST 2 HOURS AFTER FIRST DOSE *MAX 2 TABS/24 HOURS*, Disp: , Rfl:     verapamil PM (VERELAN PM) 180 MG 24 hr capsule, TAKE 1 CAPSULE BY MOUTH EVERY DAY, Disp: 90 capsule, Rfl: 3    methocarbamol (ROBAXIN) 750 MG tablet, TAKE ONE TABLET BY MOUTH TWICE DAILY AS NEEDED FOR SPASM (Patient not taking: Reported on 11/21/2023), Disp: , Rfl:     The following portions of the patient's history were reviewed and updated as appropriate: allergies, current medications, past family history, past medical history, past social history, past surgical history, and problem list.      REVIEW OF PERTINENT MEDICAL DATA    Past Medical History:   Diagnosis Date    Back pain     Hip pain     Neck pain      Past Surgical History:   Procedure Laterality Date    GASTRIC SLEEVE LAPAROSCOPIC  05/29/2019    HYSTERECTOMY      REDUCTION MAMMAPLASTY Bilateral 2005     Family History   Problem Relation Age of Onset    Dementia Mother     Heart attack Father      Social History     Socioeconomic History    Marital status:    Tobacco Use    Smoking status: Never    Smokeless tobacco: Never   Substance and Sexual Activity    Alcohol use: No    Drug use: Never    Sexual activity: Defer         Review of Systems   Musculoskeletal:  Positive for arthralgias, back pain and neck pain.         Vitals:    11/21/23 0842   BP: 140/88   Pulse: 74   Resp: 16   SpO2: 100%   PainSc:   8         Objective "   Physical Exam  Neck:     Musculoskeletal:        Legs:            Imaging Reviewed:  CT soft tissue neck  - No acute process     CT head--9/20/2023 negative     X-ray lumbar spine-2022  - Osteophyte to the lumbar level largest at L1-2,  - Moderate to severe disc space narrowing at L1-2, mild narrowing at L2-3  - Moderate to severe facet arthritis L4-5, L5-S1.    Assessment:    1. Lumbar spondylosis    2. DDD (degenerative disc disease), lumbar    3. Occipital neuralgia, unspecified laterality    4. Fibromyalgia       Plan:   1.  Defer UDS for now.  Not a good candidate for opioids.  2. We discussed trying a course of formal physical therapy.  Physical therapy can help strengthen and stretch the muscles around the joints. Continue to be as active as possible. Start physical therapy as it will help generalized pain and follow up with HEP.  Prescription sent on 10/23/2023 for PT [facility] [place] [facility].  She has not heard anything from physical therapy yet.  Another prescription sent on 11/21/2023.  3.  Patient does have significant facet arthritis and lower back and also has radicular pain in the L4, 5 dermatome.  SLR positive on the left side.  We will obtain lumbar MRI to evaluate her lower back further and rule out any significant stenosis.  May consider interventional procedures after reviewing MRI.  4.  Patient had multiple surgeries in cervical spine previously and has increased neck pain for last 3 months.  We will obtain cervical MRI to evaluate her pain further.  5.  Excellent relief with occipital nerve blocks.  6.  She had benefited from back brace in the past.  We will prescribe a back brace.  Advised patient to only use it for prolonged walking and very intermittently has using back brace can weaken lower back muscles.  Patient understands and agrees with the plan.  7.  Patient has InterStiGranite Horizon and is to call [facility] [employer] before getting cervical and lumbar MRI as there is conditional MRI compatibility with this  stimulator.  Patient will call [employer].  Lumbar and cervical MRI were done at [facility] in 2022.  We will obtain all this reports.  8. Patient has mainly AXIAL lower back pain. Patient informed they would likely benefit from a medial branch block on bilateral from L4 to Sa.  MRI shows facet arthritis. Facet tenderness is positive from L4 and Sa. Patient informed the procedure is both therapeutic and diagnostic in nature.  The procedure was described in detail and the risks, benefits and alternatives were discussed with the patient (including but not limited to: bleeding, infection, nerve damage, worsening of pain, CSF leak, inability to perform injection, paralysis, seizures, and death) who agreed to proceed.  Discussed RFA at 70-80 degree for  sec if patient has good relief from 2 diagnostic MBB.        RTC for injection and then follow-up in January 2024     David Vallejo DO  Pain Management   [facility]        INSPECT REPORT    As part of the patient's treatment plan, I may be prescribing controlled substances. The patient has been made aware of appropriate use of such medications, including potential risk of somnolence, limited ability to drive and/or work safely, and the potential for dependence or overdose. It has also been made clear that these medications are for use by this patient only, without concomitant use of alcohol or other substances unless prescribed.     Patient has completed prescribing agreement detailing terms of continued prescribing of controlled substances, including monitoring INSPECT reports, urine drug screening, and pill counts if necessary. The patient is aware that inappropriate use will results in cessation of prescribing such medications.    INSPECT report has been reviewed and scanned into the patient's chart.

## 2023-11-21 ENCOUNTER — OFFICE VISIT (OUTPATIENT)
Dept: PAIN MEDICINE | Facility: CLINIC | Age: 68
End: 2023-11-21
Payer: MEDICARE

## 2023-11-21 ENCOUNTER — TELEPHONE (OUTPATIENT)
Dept: PAIN MEDICINE | Facility: CLINIC | Age: 68
End: 2023-11-21

## 2023-11-21 VITALS
SYSTOLIC BLOOD PRESSURE: 140 MMHG | HEART RATE: 74 BPM | OXYGEN SATURATION: 100 % | DIASTOLIC BLOOD PRESSURE: 88 MMHG | RESPIRATION RATE: 16 BRPM

## 2023-11-21 DIAGNOSIS — M54.81 OCCIPITAL NEURALGIA, UNSPECIFIED LATERALITY: ICD-10-CM

## 2023-11-21 DIAGNOSIS — M51.36 DDD (DEGENERATIVE DISC DISEASE), LUMBAR: ICD-10-CM

## 2023-11-21 DIAGNOSIS — M79.7 FIBROMYALGIA: ICD-10-CM

## 2023-11-21 DIAGNOSIS — M47.816 LUMBAR SPONDYLOSIS: Primary | ICD-10-CM

## 2023-11-21 RX ORDER — METHOCARBAMOL 750 MG/1
TABLET, FILM COATED ORAL
COMMUNITY
Start: 2023-11-01

## 2023-11-21 NOTE — TELEPHONE ENCOUNTER
11/21/23-- Dr Vallejo-- imported another MRI result [place] [facility]  MRI Tspine done 6/28/22

## 2023-11-21 NOTE — TELEPHONE ENCOUNTER
Caller: INGRID     Relationship: [facility]     Best call back number: [phone]    What is the best time to reach you: 8-5 MON - FRI    Who are you requesting to speak with (clinical staff, provider,  specific staff member): REQUESTING A CALL BACK     Do you know the name of the person who called: N/A     What was the call regarding: INGRID STATES THEY RECEIVED ANOTHER ORDER TODAY FOR PHYSICAL THERAPY. SHE SAYS THE PATIENT WAS EVALUATED ON 11-9-23 AND EVERYTHING WAS SUBMITTED TO MEDICAID ON 11-10-23 THEY ARE NOW WAITING ON APPROVAL TO START THERAPY. SHE STATES PLAN OF CARE WAS SENT TO DR WILLINGHAM AND SIGNED. SHE THINKS THIS MAY BE A DUPLICATE ORDER. PLEASE CALL TO DISCUSS    Is it okay if the provider responds through trustedsafehart: N/A

## 2023-11-28 ENCOUNTER — HOSPITAL ENCOUNTER (OUTPATIENT)
Dept: MAMMOGRAPHY | Facility: HOSPITAL | Age: 68
Discharge: HOME | End: 2023-11-28
Payer: MEDICARE

## 2023-11-28 ENCOUNTER — HOSPITAL ENCOUNTER (OUTPATIENT)
Dept: PAIN MEDICINE | Facility: HOSPITAL | Age: 68
Discharge: HOME | End: 2023-11-28
Payer: MEDICARE

## 2023-11-28 VITALS
DIASTOLIC BLOOD PRESSURE: 68 MMHG | TEMPERATURE: 97.7 F | HEIGHT: 61 IN | HEART RATE: 75 BPM | BODY MASS INDEX: 36.82 KG/M2 | OXYGEN SATURATION: 97 % | SYSTOLIC BLOOD PRESSURE: 152 MMHG | WEIGHT: 195 LBS | RESPIRATION RATE: 16 BRPM

## 2023-11-28 DIAGNOSIS — M47.816 LUMBAR SPONDYLOSIS: Primary | ICD-10-CM

## 2023-11-28 DIAGNOSIS — R52 PAIN: ICD-10-CM

## 2023-11-28 DIAGNOSIS — Z12.31 VISIT FOR SCREENING MAMMOGRAM: ICD-10-CM

## 2023-11-28 PROCEDURE — 77003 FLUOROGUIDE FOR SPINE INJECT: CPT

## 2023-11-28 PROCEDURE — 77063 BREAST TOMOSYNTHESIS BI: CPT

## 2023-11-28 PROCEDURE — 64494 INJ PARAVERT F JNT L/S 2 LEV: CPT | Performed by: STUDENT IN AN ORGANIZED HEALTH CARE EDUCATION/TRAINING PROGRAM

## 2023-11-28 PROCEDURE — 25010000002 METHYLPREDNISOLONE PER 80 MG: Performed by: STUDENT IN AN ORGANIZED HEALTH CARE EDUCATION/TRAINING PROGRAM

## 2023-11-28 PROCEDURE — 64493 INJ PARAVERT F JNT L/S 1 LEV: CPT | Performed by: STUDENT IN AN ORGANIZED HEALTH CARE EDUCATION/TRAINING PROGRAM

## 2023-11-28 PROCEDURE — 77067 SCR MAMMO BI INCL CAD: CPT

## 2023-11-28 PROCEDURE — 25010000002 BUPIVACAINE (PF) 0.25 % SOLUTION: Performed by: STUDENT IN AN ORGANIZED HEALTH CARE EDUCATION/TRAINING PROGRAM

## 2023-11-28 RX ORDER — METHYLPREDNISOLONE ACETATE 80 MG/ML
80 INJECTION, SUSPENSION INTRA-ARTICULAR; INTRALESIONAL; INTRAMUSCULAR; SOFT TISSUE ONCE
Status: COMPLETED | OUTPATIENT
Start: 2023-11-28 | End: 2023-11-28

## 2023-11-28 RX ORDER — LIDOCAINE HYDROCHLORIDE 10 MG/ML
5 INJECTION, SOLUTION EPIDURAL; INFILTRATION; INTRACAUDAL; PERINEURAL ONCE
Status: COMPLETED | OUTPATIENT
Start: 2023-11-28 | End: 2023-11-28

## 2023-11-28 RX ORDER — BUPIVACAINE HYDROCHLORIDE 2.5 MG/ML
10 INJECTION, SOLUTION EPIDURAL; INFILTRATION; INTRACAUDAL ONCE
Status: COMPLETED | OUTPATIENT
Start: 2023-11-28 | End: 2023-11-28

## 2023-11-28 RX ADMIN — LIDOCAINE HYDROCHLORIDE 5 ML: 10 INJECTION, SOLUTION EPIDURAL; INFILTRATION; INTRACAUDAL; PERINEURAL at 13:12

## 2023-11-28 RX ADMIN — BUPIVACAINE HYDROCHLORIDE 10 ML: 2.5 INJECTION, SOLUTION EPIDURAL; INFILTRATION; INTRACAUDAL; PERINEURAL at 13:13

## 2023-11-28 RX ADMIN — METHYLPREDNISOLONE ACETATE 80 MG: 80 INJECTION, SUSPENSION INTRA-ARTICULAR; INTRALESIONAL; INTRAMUSCULAR; SOFT TISSUE at 13:13

## 2023-11-28 NOTE — H&P
H and P reviewed from previous visit and no changes to patient's clinical presentation. Will proceed with procedure as planned. Patient denies history of DM and being on blood thinners.    Denies any suicidal ideations.  Patient had suicidal thoughts in the remote past but depression has been extremely well-controlled with medications.    David Vallejo DO  Pain Management   [facility]

## 2023-11-28 NOTE — ADDENDUM NOTE
Encounter addended by: David Vallejo DO on: 11/28/2023 1:43 PM   Actions taken: Order list changed, Diagnosis association updated Spontaneous, unlabored and symmetrical

## 2023-11-28 NOTE — PROCEDURES
PROCEDURE:  Bilateral L 4, 5 and SA MBB     INDICATION: Patient complains of pain in the lower back, bilateral.  Pain increases on extension of the spine, facet tenderness present.       PROCEDURE DETAILS:   After obtaining written informed consent patient was taken to the procedure room. Pre-procedure blood pressure and pulse were stable and recorded in patients clinic chart. The patient was placed in a prone position and the lower back was prepped with chloraprep and draped in the usual sterile fashion.  The skin was infiltrated with 1% lidocaine at the junction of transverse process with the vertebral body at the left L 4 level. A 22-gauge, 3.5-inch needle was inserted into the lumbar medial branch under radiographic guidance. Both AP and lateral views were obtained.   Following placement of the needle and negative aspiration, 1.2 cc mixture of bupivacaine 0.25% with depo-medrol was injected  The identical procedure was performed on left L5 and SA medial branch was repeated on right side at L4, L5, Sa.  A total of 9 cc of 0.25% bupivacaine with 80 mg of Depo-medrol was injected. During the procedure there was no evidence of CSF, paresthesias or heme.    After the procedure the needles were removed. Skin was cleaned and a sterile dressing was applied.    Following the procedure the patient's vital signs were stable. The patient was discharged home in good condition after being given discharge instructions.    COMPLICATIONS None    David Vallejo DO  Pain Management   [facility]

## 2023-11-28 NOTE — DISCHARGE INSTRUCTIONS
Medial Branch Nerve Block    Medial branch nerve block is a procedure to numb the nerves that supply the joints between your spinal bones (facet joints). The facet joints are located on the back of your spine. You may have the procedure on your neck or your upper, middle, or lower spine.  During this procedure, your health care provider will inject a numbing medicine (local anesthetic) around the medial nerves near the facet joint being treated. If more than one facet joint is causing pain, you may have more than one injection. In most cases, an anti-inflammatory medicine (steroid) will also be injected. You may need this procedure if:  You have back pain from wear and tear (osteoarthritis) of your facet joint.  You have an injury to a facet joint.  Your health care provider wants to diagnose a facet joint as the cause of your pain. If the procedure relieves the pain, this indicates that the facet joint was the cause.  The local anesthetic will relieve pain for several days. The steroid may continue to relieve pain for several weeks. If your pain returns when the medicines wear off, this procedure may be repeated.  Tell a health care provider about:  Any allergies you have.  All medicines you are taking, including vitamins, herbs, eye drops, creams, and over-the-counter medicines.  Any problems you or family members have had with anesthetic medicines.  Any blood disorders you have.  Any surgeries you have had.  Any medical conditions you have.  Whether you are pregnant or may be pregnant.  What are the risks?  Generally, this is a safe procedure. However, problems may occur, including:  Infection.  Bleeding.  Allergic reactions to medicines or dyes.  Damage to other structures or organs.  Injection of the anesthetic into a blood vessel, which may decrease blood supply to your spinal cord and cause damage.  Spread of the anesthetic to nearby nerves, which may cause temporary weakness or numbness.  What happens  before the procedure?  Ask your health care provider about:  Changing or stopping your regular medicines. This is especially important if you are taking diabetes medicines or blood thinners.  Taking over-the-counter medicines, vitamins, herbs, and supplements.  Plan to have a responsible adult take you home from the hospital or clinic if required  Ask your health care provider what steps will be taken to help prevent infection. These may include:  Washing skin with a germ-killing soap.  What happens during the procedure?  You will be given one or more of the following:  A medicine to numb the area (local anesthetic). Your health care provider will feel for the facet joint or joints that are causing pain and inject a short-acting local anesthetic into the skin over the joint or joints.  Your health care provider will then pass a needle into the area around the facet joint.  Your health care provider may use a type of X-ray (fluoroscopy) to look at images of your spinal cord. If so, the health care provider will inject a small amount of dye into the facet joint area. The dye will show up on fluoroscopy and help locate the exact area to inject the long-acting anesthetic.  The medicine will then be injected. Along with the long-acting anesthetic, a steroid medicine may also be injected.  The needle will be removed, and a bandage/band-aid will be placed over the injection site.  The procedure may vary among health care providers.  What can I expect after the procedure?  Your blood pressure, heart rate, breathing rate, and blood oxygen level will be monitored until you leave the hospital or clinic.  You should feel less pain in your back.  You may have some soreness around the injection site.  Follow these instructions at home:  Injection site care  Leave your bandage/band-aid on for 24 hours.  Do not take baths, swim, or use a hot tub for 24 hours.  Check your injection site every day for signs of infection. Check  for:  Redness, swelling, or pain.  Fluid or blood.  Warmth.  Pus or a bad smell.  If you need something for comfort at the site, put ice on the injection area:  Put ice in a plastic bag.  Place a towel between your skin and the bag.  Leave the ice on for 20 minutes, 2-3 times a day.       5.  No heat to injection are for 24-48 hours.  General instructions  Continue your normal pain medications after the procedure.  No heavy lifting, pushing or pulling after the procedure for 24 hours then return to your normal activities.  Keep a log of your pain after the procedure. Keep track of how much pain you have and when you have it. This will help your health care provider plan your future treatment.  You should have relief of pain from the anesthetic for up to 2-3 days.  After that you may notice some pain again until the steroid starts to help. Pain relief from the steroid may last for a few weeks.       4.  Keep all follow-up visits as told by your health care provider. This is important.  Contact your health care provider if:  Your pain is not relieved or gets worse at home.  You have a fever or chills.  You have any signs of infection.  You develop any numbness or weakness.  Summary  Medial branch nerve block is a procedure to numb the nerves that supply the joints between your spinal bones (facet joint).  You may have the procedure on your neck or your upper, middle, or lower spine.  This procedure may be done both to diagnose and relieve facet joint pain.  A long-acting local anesthetic is injected close to the nerve that supplies the facet joint. An anti-inflammatory medicine (steroid) may also be injected.  This information is not intended to replace advice given to you by your health care provider. Make sure you discuss any questions you have with your health care provider.  Document Revised: 04/16/2021 Document Reviewed: 08/22/2019  Elsevier Patient Education © 2021 [employer]

## 2023-11-28 NOTE — ADDENDUM NOTE
Encounter addended by: Sharla Gil RN on: 11/28/2023 1:45 PM   Actions taken: MAR administration accepted

## 2023-11-29 ENCOUNTER — TELEPHONE (OUTPATIENT)
Dept: PAIN MEDICINE | Facility: HOSPITAL | Age: 68
End: 2023-11-29
Payer: MEDICARE

## 2023-11-29 NOTE — TELEPHONE ENCOUNTER
Post procedure phone call completed.  Pt states they are doing good and denies questions or concerns.  Relief is 75% at its best.

## 2024-01-29 NOTE — PROGRESS NOTES
Subjective   Omaira Roque is a 68 y.o. female is here for follow up for neck pain and lower back pain. Last seen on 11/28/2023 for B/L L4-SA MBB.  Scheduled for MRIs on 2/1/2024.    On last visit:       Neck pain is 4/10 on VAS, maximum 6/10.  Pain is locking up, dull, achy in nature.  Referred to right occipital region.  Patient states that it feels like a knot and interval increase in size with increased headaches.  Pain is intermittent.  Pain is improved by ice pack and right occipital nerve block.  Worse with turning head.  Chronic migraines but recent headaches feel different.  Right-sided headache starts in the occipital region/base of neck and radiates to top of her head and consistent with occipital neuralgia.       Lower back pain is 4/10 on VAS, at maximum is 5/10. Pain is sharp, shooting, and stabbing in nature. Pain is referred left hip, left poseriolateral knee. The pain is constant. The pain is improved by rest, back brace. The pain is worse with walking, standing, taking stairs. Can only do 13 stairs at a time, can't do stairs, can not walk more than a block.  Her main complaint is axial lower back pain.    Previous Injection:   11/28/2023-B/L L4-SA MBB- 80% pain relief with functional improvement. Still have to watch stairs, but able to walk longer.  10/31/2023-right occipital nerve block- 75% pain relief. Headaches are much improved.     Hx:  Referred by  Pat Dumont APRN for neck pain. History of chronic neck pain with multiple surgeries in the past with acutely worsened pain recently 3 months ago with new onset of rash (R arm and R chest) and swelling that comes up on the right side of her neck, also have some fuzziness in vision (was told she has dry eyes, scheduled to see next week).  She was started on muscle relaxants but it caused syncope.  Also history of migraines with good control with Emgality and Ubrelvy, but not since last 3 months. MRI had been denied by insurance due to no  "PT in last 6 months.  Her last physical therapy was 1 year ago which actually worsened her pain that she refused to go back to physical therapy.  Patient has significant history of depression.  Patient states that she was on a lot of pain medications in the past and decided to stop them all of a sudden.  She was also admitted to mental facility for suicidal ideations several years ago.  Depression is well controlled currently and denies any suicidal ideations.              PHQ-9-20  SOAPP- 4  [place] back disability scale - 89     PMH:   Gastric sleeve, bilateral carpal tunnel release, migraines,  rotator cuff left side, history of epidural injection and cervical spine, history of spinal stimulator placed and removed- had it for 3 months - \"had shocks in back and had to be removed\" - sounds like a trial??, cervical surgery X4 (cadaver bones X2, hip bone X1, fusion - 15 years ago), hx of cataract surgery .     Current Medications:   Lyrica 300 mg daily   Savella 100 mg BID  Emgality  Propanolol  Uberlvy        Past Medications:     Past Modalities:  TENS:                                                                          no                                                  Physical Therapy Within The Last 6 Months              Yes -11/28/2023 - 12/13/2023-[facility]  Psychotherapy                                                            no  Massage Therapy                                                       no     Patient Complains Of:  Uro-Fecal Incontinence          Yes - Chronic fecal incontinence - seen at [facility] - recommended pelvic floor exercises with PT - non consistent with PT; s/p interstim  Weight Gain/Loss                   no  Fever/Chills                             no  Weakness                               no        PEG Assessment   What number best describes your pain on average in the past week?7  What number best describes how, during the past week, pain has interfered with your " enjoyment of life?8  What number best describes how, during the past week, pain has interfered with your general activity?  8     Headaches began: 50 years ago after birth of a child   Headache Severity at Worst: 10/10  Headache Location: right occipital region, wraps around head   Headache Frequency: daily    Headache Duration: 18-20 hrs   Headache onset:   Headache Character: vision goes away, throbbing  Headache Aura: starts  with dull, ache   Associated Symptoms of nausea, emesis, photophobia, phonophobia  Associated Autonomic Symptoms:   Associated Visual Changes: yes, vision changes   Alleviating Factors: medications   Additional Exacerbating Factors: light, sound  History of Significant Head Injury: none  Last Eyeglasses / Contacts Prescription Check: 1 year ago  FH of Headache: father   FH of Brain Tumor: none  FH of Brain Aneurysm: aunt  Three regular meals each day: 2   Sleep: 7 hrs   Caffeine Use: soda  OTC NSAID Use: tylenol  Past Prescription Headache Medications: Botox - didn't help   Current Prescription Headache Medications: Emgality, Ubervly, Lyrica, propanolol, verapamil         Current Outpatient Medications:     atorvastatin (LIPITOR) 40 MG tablet, Daily., Disp: , Rfl:     BREO ELLIPTA 200-25 MCG/INH inhaler, Inhale 1 puff Daily., Disp: , Rfl: 3    buPROPion XL (WELLBUTRIN XL) 300 MG 24 hr tablet, Take 1 tablet by mouth Daily., Disp: , Rfl:     cyanocobalamin 1000 MCG/ML injection, INJECT 1 ML INTRAMUSCULARLY ONCE A WEEK, Disp: , Rfl: 5    DEXILANT 60 MG capsule, TAKE 1 CAPSULE BY MOUTH EVERY DAY, Disp: 90 capsule, Rfl: 3    dicyclomine (BENTYL) 10 MG capsule, TAKE ONE CAPSULE BY MOUTH FOUR TIMES DAILY AS NEEDED, Disp: , Rfl:     famotidine (PEPCID) 40 MG tablet, , Disp: , Rfl:     furosemide (LASIX) 20 MG tablet, TAKE 1 TABLET BY MOUTH EVERY DAY, Disp: 90 tablet, Rfl: 1    methocarbamol (ROBAXIN) 750 MG tablet, , Disp: , Rfl:     PARoxetine (PAXIL) 40 MG tablet, TAKE 1 TABLET BY MOUTH EVERY DAY,  "Disp: 90 tablet, Rfl: 3    pregabalin (LYRICA) 300 MG capsule, , Disp: , Rfl:     promethazine (PHENERGAN) 25 MG tablet, Every 6 (Six) Hours., Disp: , Rfl:     propranolol LA (INDERAL LA) 120 MG 24 hr capsule, Take 1 capsule by mouth Daily., Disp: , Rfl: 2    raNITIdine (ZANTAC) 150 MG capsule, Take 1 capsule by mouth., Disp: , Rfl:     Restasis 0.05 % ophthalmic emulsion, instill 1 drop by ophthalmic route 2 times every day into both eyes, Disp: , Rfl:     Savella 100 MG tablet, Take 1 tablet by mouth Every 12 (Twelve) Hours., Disp: , Rfl:     Syringe/Needle, Disp, (BD ECLIPSE SYRINGE) 25G X 1\" 3 ML misc, BD ECLIPSE SYRINGE 25G X 1\" 3 ML, Disp: , Rfl:     Thyroid (ARMOUR THYROID) 90 MG PO tablet, Daily., Disp: , Rfl:     tiZANidine (ZANAFLEX) 4 MG tablet, , Disp: , Rfl:     Ubrelvy 100 MG tablet, TAKE 1 TABLET BY MOUTH AS NEEDED FOR MIGRAINE IF NEEDED THEN TAKE 2ND DOSE AT LEAST 2 HOURS AFTER FIRST DOSE *MAX 2 TABS/24 HOURS*, Disp: , Rfl:     verapamil PM (VERELAN PM) 180 MG 24 hr capsule, TAKE 1 CAPSULE BY MOUTH EVERY DAY, Disp: 90 capsule, Rfl: 3    The following portions of the patient's history were reviewed and updated as appropriate: allergies, current medications, past family history, past medical history, past social history, past surgical history, and problem list.      REVIEW OF PERTINENT MEDICAL DATA    Past Medical History:   Diagnosis Date    Back pain     Hip pain     Neck pain      Past Surgical History:   Procedure Laterality Date    GASTRIC SLEEVE LAPAROSCOPIC  05/29/2019    HYSTERECTOMY      REDUCTION MAMMAPLASTY Bilateral 2005     Family History   Problem Relation Age of Onset    Dementia Mother     Heart attack Father      Social History     Socioeconomic History    Marital status:    Tobacco Use    Smoking status: Never    Smokeless tobacco: Never   Substance and Sexual Activity    Alcohol use: No    Drug use: Never    Sexual activity: Defer         Review of Systems   Musculoskeletal:  " Positive for arthralgias, back pain and neck pain.         Vitals:    01/30/24 0857   BP: 140/89   Pulse: 86   Resp: 16   SpO2: 98%   Weight: 88.5 kg (195 lb)   PainSc:   5           Objective   Physical Exam  Neck:     Musculoskeletal:        Legs:            Imaging Reviewed:  CT soft tissue neck  - No acute process     CT head--9/20/2023 negative     X-ray lumbar spine-2022  - Osteophyte to the lumbar level largest at L1-2,  - Moderate to severe disc space narrowing at L1-2, mild narrowing at L2-3  - Moderate to severe facet arthritis L4-5, L5-S1.    Assessment:    1. Lumbar spondylosis    2. DDD (degenerative disc disease), lumbar    3. Occipital neuralgia, unspecified laterality    4. Fibromyalgia    5. Cervical spondylosis         Plan:   1.  Defer UDS for now.  Not a good candidate for opioids.  2. We discussed trying a course of formal physical therapy.  Physical therapy can help strengthen and stretch the muscles around the joints. Continue to be as active as possible. Start physical therapy as it will help generalized pain and follow up with HEP.  Prescription sent on 10/23/2023 for [facility] re[facility].  She has not heard anything from physical therapy yet.  Another prescription sent on 11/21/2023.  3.  Patient does have significant facet arthritis and lower back and also has radicular pain in the L4, 5 dermatome.  SLR positive on the left side.  We will obtain lumbar MRI to evaluate her lower back further and rule out any significant stenosis.  May consider interventional procedures after reviewing MRI.  4.  Patient had multiple surgeries in cervical spine previously and has increased neck pain for last 3 months.  We will obtain cervical MRI to evaluate her pain further.  5.  Excellent relief with occipital nerve blocks.  6.  She had benefited from back brace in the past.  We will prescribe a back brace.  Advised patient to only use it for prolonged walking and very intermittently has using back brace can  weaken lower back muscles.  Patient understands and agrees with the plan.  7.  Patient has InterStim and is to call [employer] before getting cervical and lumbar MRI as there is conditional MRI compatibility with this stimulator.  Patient will call [employer].  Lumbar and cervical MRI scheduled on 2/1/24.   8. Excellent relief with MBB, repeat PRN      RTC on 2/13/24 to review MRIs.     David Vallejo DO  Pain Management   [facility]        INSPECT REPORT    As part of the patient's treatment plan, I may be prescribing controlled substances. The patient has been made aware of appropriate use of such medications, including potential risk of somnolence, limited ability to drive and/or work safely, and the potential for dependence or overdose. It has also been made clear that these medications are for use by this patient only, without concomitant use of alcohol or other substances unless prescribed.     Patient has completed prescribing agreement detailing terms of continued prescribing of controlled substances, including monitoring INSPECT reports, urine drug screening, and pill counts if necessary. The patient is aware that inappropriate use will results in cessation of prescribing such medications.    INSPECT report has been reviewed and scanned into the patient's chart.

## 2024-01-30 ENCOUNTER — OFFICE VISIT (OUTPATIENT)
Dept: PAIN MEDICINE | Facility: CLINIC | Age: 69
End: 2024-01-30
Payer: MEDICARE

## 2024-01-30 VITALS
BODY MASS INDEX: 36.84 KG/M2 | RESPIRATION RATE: 16 BRPM | DIASTOLIC BLOOD PRESSURE: 89 MMHG | SYSTOLIC BLOOD PRESSURE: 140 MMHG | OXYGEN SATURATION: 98 % | HEART RATE: 86 BPM | WEIGHT: 195 LBS

## 2024-01-30 DIAGNOSIS — M54.81 OCCIPITAL NEURALGIA, UNSPECIFIED LATERALITY: ICD-10-CM

## 2024-01-30 DIAGNOSIS — M47.816 LUMBAR SPONDYLOSIS: Primary | ICD-10-CM

## 2024-01-30 DIAGNOSIS — M51.36 DDD (DEGENERATIVE DISC DISEASE), LUMBAR: ICD-10-CM

## 2024-01-30 DIAGNOSIS — M47.812 CERVICAL SPONDYLOSIS: ICD-10-CM

## 2024-01-30 DIAGNOSIS — M79.7 FIBROMYALGIA: ICD-10-CM

## 2024-01-30 RX ORDER — CYCLOSPORINE 0.5 MG/ML
EMULSION OPHTHALMIC
COMMUNITY
Start: 2023-12-12

## 2024-02-13 ENCOUNTER — OFFICE VISIT (OUTPATIENT)
Dept: PAIN MEDICINE | Facility: CLINIC | Age: 69
End: 2024-02-13
Payer: MEDICARE

## 2024-02-13 VITALS
HEART RATE: 70 BPM | BODY MASS INDEX: 35.33 KG/M2 | WEIGHT: 187 LBS | SYSTOLIC BLOOD PRESSURE: 146 MMHG | RESPIRATION RATE: 16 BRPM | OXYGEN SATURATION: 97 % | DIASTOLIC BLOOD PRESSURE: 90 MMHG

## 2024-02-13 DIAGNOSIS — M47.812 CERVICAL SPONDYLOSIS: ICD-10-CM

## 2024-02-13 DIAGNOSIS — M54.81 OCCIPITAL NEURALGIA, UNSPECIFIED LATERALITY: ICD-10-CM

## 2024-02-13 DIAGNOSIS — M51.36 DDD (DEGENERATIVE DISC DISEASE), LUMBAR: ICD-10-CM

## 2024-02-13 DIAGNOSIS — M47.816 LUMBAR SPONDYLOSIS: Primary | ICD-10-CM

## 2024-02-13 DIAGNOSIS — M79.7 FIBROMYALGIA: ICD-10-CM

## 2024-02-26 ENCOUNTER — TELEPHONE (OUTPATIENT)
Dept: PAIN MEDICINE | Facility: CLINIC | Age: 69
End: 2024-02-26
Payer: MEDICARE

## 2024-02-26 NOTE — TELEPHONE ENCOUNTER
Caller: JEIMY SHORT    Relationship to patient: PATIENT     Best call back number: [phone]    Chief complaint: LUMBAR     Type of visit: MEDIAL BRANCH BLOCK     Requested date: WILL CALL BACK TO RESCHEDULE      If rescheduling, when is the original appointment: 02/29/24     Additional notes:PATIENT WOULD LIKE TO CX PLEASE

## 2024-03-16 ENCOUNTER — HOSPITAL ENCOUNTER (INPATIENT)
Facility: HOSPITAL | Age: 69
LOS: 2 days | Discharge: HOME | DRG: 177 | End: 2024-03-18
Attending: EMERGENCY MEDICINE | Admitting: INTERNAL MEDICINE
Payer: MEDICARE

## 2024-03-16 ENCOUNTER — APPOINTMENT (OUTPATIENT)
Dept: GENERAL RADIOLOGY | Facility: HOSPITAL | Age: 69
DRG: 177 | End: 2024-03-16
Payer: MEDICARE

## 2024-03-16 ENCOUNTER — APPOINTMENT (OUTPATIENT)
Dept: CT IMAGING | Facility: HOSPITAL | Age: 69
DRG: 177 | End: 2024-03-16
Payer: MEDICARE

## 2024-03-16 DIAGNOSIS — R41.82 ALTERED MENTAL STATUS, UNSPECIFIED ALTERED MENTAL STATUS TYPE: ICD-10-CM

## 2024-03-16 DIAGNOSIS — J18.9 PNEUMONIA OF BOTH LOWER LOBES DUE TO INFECTIOUS ORGANISM: ICD-10-CM

## 2024-03-16 DIAGNOSIS — U07.1 COVID-19: ICD-10-CM

## 2024-03-16 DIAGNOSIS — N39.0 ACUTE UTI: Primary | ICD-10-CM

## 2024-03-16 LAB
ALBUMIN SERPL-MCNC: 4.6 G/DL (ref 3.5–5.2)
ALBUMIN/GLOB SERPL: 1.4 G/DL
ALP SERPL-CCNC: 114 U/L (ref 39–117)
ALT SERPL W P-5'-P-CCNC: 22 U/L (ref 1–33)
AMMONIA BLD-SCNC: 11 UMOL/L (ref 11–51)
AMPHET+METHAMPHET UR QL: NEGATIVE
ANION GAP SERPL CALCULATED.3IONS-SCNC: 19 MMOL/L (ref 5–15)
AST SERPL-CCNC: 27 U/L (ref 1–32)
BACTERIA UR QL AUTO: ABNORMAL /HPF
BARBITURATES UR QL SCN: NEGATIVE
BASOPHILS # BLD AUTO: 0.06 10*3/MM3 (ref 0–0.2)
BASOPHILS NFR BLD AUTO: 0.8 % (ref 0–1.5)
BENZODIAZ UR QL SCN: NEGATIVE
BILIRUB SERPL-MCNC: 0.7 MG/DL (ref 0–1.2)
BILIRUB UR QL STRIP: ABNORMAL
BUN SERPL-MCNC: 17 MG/DL (ref 8–23)
BUN/CREAT SERPL: 18.9 (ref 7–25)
CALCIUM SPEC-SCNC: 9.7 MG/DL (ref 8.6–10.5)
CANNABINOIDS SERPL QL: NEGATIVE
CHLORIDE SERPL-SCNC: 103 MMOL/L (ref 98–107)
CLARITY UR: ABNORMAL
CO2 SERPL-SCNC: 17 MMOL/L (ref 22–29)
COCAINE UR QL: NEGATIVE
COLOR UR: ABNORMAL
CREAT SERPL-MCNC: 0.9 MG/DL (ref 0.57–1)
CRP SERPL-MCNC: <0.3 MG/DL (ref 0–0.5)
D DIMER PPP FEU-MCNC: 0.41 MG/L (FEU) (ref 0–0.67)
D-LACTATE SERPL-SCNC: 2 MMOL/L (ref 0.3–2)
DEPRECATED RDW RBC AUTO: 49.6 FL (ref 37–54)
EGFRCR SERPLBLD CKD-EPI 2021: 70.2 ML/MIN/1.73
EOSINOPHIL # BLD AUTO: 0.07 10*3/MM3 (ref 0–0.4)
EOSINOPHIL NFR BLD AUTO: 1 % (ref 0.3–6.2)
ERYTHROCYTE [DISTWIDTH] IN BLOOD BY AUTOMATED COUNT: 16.3 % (ref 12.3–15.4)
ETHANOL UR QL: <0.01 %
FLUAV RNA RESP QL NAA+PROBE: NOT DETECTED
FLUBV RNA RESP QL NAA+PROBE: NOT DETECTED
GLOBULIN UR ELPH-MCNC: 3.2 GM/DL
GLUCOSE SERPL-MCNC: 131 MG/DL (ref 65–99)
GLUCOSE UR STRIP-MCNC: NEGATIVE MG/DL
HCT VFR BLD AUTO: 45.6 % (ref 34–46.6)
HGB BLD-MCNC: 14.1 G/DL (ref 12–15.9)
HGB UR QL STRIP.AUTO: NEGATIVE
HOLD SPECIMEN: NORMAL
HOLD SPECIMEN: NORMAL
HYALINE CASTS UR QL AUTO: ABNORMAL /LPF
IMM GRANULOCYTES # BLD AUTO: 0.02 10*3/MM3 (ref 0–0.05)
IMM GRANULOCYTES NFR BLD AUTO: 0.3 % (ref 0–0.5)
KETONES UR QL STRIP: ABNORMAL
L PNEUMO1 AG UR QL IA: NEGATIVE
LDH SERPL-CCNC: 194 U/L (ref 135–214)
LEUKOCYTE ESTERASE UR QL STRIP.AUTO: ABNORMAL
LYMPHOCYTES # BLD AUTO: 2.17 10*3/MM3 (ref 0.7–3.1)
LYMPHOCYTES NFR BLD AUTO: 30.5 % (ref 19.6–45.3)
MCH RBC QN AUTO: 25.7 PG (ref 26.6–33)
MCHC RBC AUTO-ENTMCNC: 30.9 G/DL (ref 31.5–35.7)
MCV RBC AUTO: 83.2 FL (ref 79–97)
METHADONE UR QL SCN: NEGATIVE
MONOCYTES # BLD AUTO: 0.55 10*3/MM3 (ref 0.1–0.9)
MONOCYTES NFR BLD AUTO: 7.7 % (ref 5–12)
MRSA DNA SPEC QL NAA+PROBE: NORMAL
MUCOUS THREADS URNS QL MICRO: ABNORMAL /HPF
NEUTROPHILS NFR BLD AUTO: 4.25 10*3/MM3 (ref 1.7–7)
NEUTROPHILS NFR BLD AUTO: 59.7 % (ref 42.7–76)
NITRITE UR QL STRIP: NEGATIVE
NRBC BLD AUTO-RTO: 0 /100 WBC (ref 0–0.2)
OPIATES UR QL: NEGATIVE
OXYCODONE UR QL SCN: NEGATIVE
PH UR STRIP.AUTO: 5.5 [PH] (ref 5–8)
PLATELET # BLD AUTO: 464 10*3/MM3 (ref 140–450)
PMV BLD AUTO: 9.4 FL (ref 6–12)
POTASSIUM SERPL-SCNC: 3.7 MMOL/L (ref 3.5–5.2)
PROCALCITONIN SERPL-MCNC: 0.05 NG/ML (ref 0–0.25)
PROT SERPL-MCNC: 7.8 G/DL (ref 6–8.5)
PROT UR QL STRIP: ABNORMAL
RBC # BLD AUTO: 5.48 10*6/MM3 (ref 3.77–5.28)
RBC # UR STRIP: ABNORMAL /HPF
REF LAB TEST METHOD: ABNORMAL
RSV RNA RESP QL NAA+PROBE: NOT DETECTED
S PNEUM AG SPEC QL LA: NEGATIVE
SARS-COV-2 RNA RESP QL NAA+PROBE: DETECTED
SODIUM SERPL-SCNC: 139 MMOL/L (ref 136–145)
SP GR UR STRIP: 1.03 (ref 1–1.03)
SQUAMOUS #/AREA URNS HPF: ABNORMAL /HPF
TROPONIN T SERPL HS-MCNC: <6 NG/L
TSH SERPL DL<=0.05 MIU/L-ACNC: 5.38 UIU/ML (ref 0.27–4.2)
UROBILINOGEN UR QL STRIP: ABNORMAL
WBC # UR STRIP: ABNORMAL /HPF
WBC NRBC COR # BLD AUTO: 7.12 10*3/MM3 (ref 3.4–10.8)
WHOLE BLOOD HOLD COAG: NORMAL
WHOLE BLOOD HOLD SPECIMEN: NORMAL

## 2024-03-16 PROCEDURE — 25010000002 ONDANSETRON PER 1 MG: Performed by: INTERNAL MEDICINE

## 2024-03-16 PROCEDURE — 87641 MR-STAPH DNA AMP PROBE: CPT | Performed by: INTERNAL MEDICINE

## 2024-03-16 PROCEDURE — 83615 LACTATE (LD) (LDH) ENZYME: CPT | Performed by: INTERNAL MEDICINE

## 2024-03-16 PROCEDURE — 99285 EMERGENCY DEPT VISIT HI MDM: CPT

## 2024-03-16 PROCEDURE — 87449 NOS EACH ORGANISM AG IA: CPT | Performed by: INTERNAL MEDICINE

## 2024-03-16 PROCEDURE — 82140 ASSAY OF AMMONIA: CPT | Performed by: INTERNAL MEDICINE

## 2024-03-16 PROCEDURE — 25010000002 VANCOMYCIN HCL IN NACL 1.75-0.9 GM/500ML-% SOLUTION

## 2024-03-16 PROCEDURE — 83605 ASSAY OF LACTIC ACID: CPT

## 2024-03-16 PROCEDURE — 80307 DRUG TEST PRSMV CHEM ANLYZR: CPT

## 2024-03-16 PROCEDURE — 36415 COLL VENOUS BLD VENIPUNCTURE: CPT

## 2024-03-16 PROCEDURE — 25010000002 AZTREONAM PER 500 MG

## 2024-03-16 PROCEDURE — 84484 ASSAY OF TROPONIN QUANT: CPT

## 2024-03-16 PROCEDURE — 85025 COMPLETE CBC W/AUTO DIFF WBC: CPT | Performed by: EMERGENCY MEDICINE

## 2024-03-16 PROCEDURE — 71045 X-RAY EXAM CHEST 1 VIEW: CPT

## 2024-03-16 PROCEDURE — 87040 BLOOD CULTURE FOR BACTERIA: CPT | Performed by: EMERGENCY MEDICINE

## 2024-03-16 PROCEDURE — 84443 ASSAY THYROID STIM HORMONE: CPT

## 2024-03-16 PROCEDURE — 70450 CT HEAD/BRAIN W/O DYE: CPT

## 2024-03-16 PROCEDURE — 87899 AGENT NOS ASSAY W/OPTIC: CPT | Performed by: INTERNAL MEDICINE

## 2024-03-16 PROCEDURE — 87086 URINE CULTURE/COLONY COUNT: CPT

## 2024-03-16 PROCEDURE — 81001 URINALYSIS AUTO W/SCOPE: CPT

## 2024-03-16 PROCEDURE — 93005 ELECTROCARDIOGRAM TRACING: CPT | Performed by: EMERGENCY MEDICINE

## 2024-03-16 PROCEDURE — P9612 CATHETERIZE FOR URINE SPEC: HCPCS

## 2024-03-16 PROCEDURE — 25010000002 HYDROMORPHONE 1 MG/ML SOLUTION: Performed by: INTERNAL MEDICINE

## 2024-03-16 PROCEDURE — 86140 C-REACTIVE PROTEIN: CPT | Performed by: INTERNAL MEDICINE

## 2024-03-16 PROCEDURE — 85379 FIBRIN DEGRADATION QUANT: CPT | Performed by: INTERNAL MEDICINE

## 2024-03-16 PROCEDURE — 63710000001 PROMETHAZINE PER 12.5 MG: Performed by: INTERNAL MEDICINE

## 2024-03-16 PROCEDURE — 82077 ASSAY SPEC XCP UR&BREATH IA: CPT

## 2024-03-16 PROCEDURE — 25010000002 CEFTRIAXONE PER 250 MG: Performed by: INTERNAL MEDICINE

## 2024-03-16 PROCEDURE — 87637 SARSCOV2&INF A&B&RSV AMP PRB: CPT

## 2024-03-16 PROCEDURE — 80053 COMPREHEN METABOLIC PANEL: CPT | Performed by: EMERGENCY MEDICINE

## 2024-03-16 PROCEDURE — 84145 PROCALCITONIN (PCT): CPT | Performed by: INTERNAL MEDICINE

## 2024-03-16 RX ORDER — BUPROPION HYDROCHLORIDE 150 MG/1
450 TABLET ORAL DAILY
Status: DISCONTINUED | OUTPATIENT
Start: 2024-03-16 | End: 2024-03-18 | Stop reason: HOSPADM

## 2024-03-16 RX ORDER — SODIUM CHLORIDE 0.9 % (FLUSH) 0.9 %
10 SYRINGE (ML) INJECTION AS NEEDED
Status: DISCONTINUED | OUTPATIENT
Start: 2024-03-16 | End: 2024-03-18 | Stop reason: HOSPADM

## 2024-03-16 RX ORDER — POLYETHYLENE GLYCOL 3350 17 G/17G
17 POWDER, FOR SOLUTION ORAL DAILY PRN
Status: DISCONTINUED | OUTPATIENT
Start: 2024-03-16 | End: 2024-03-18 | Stop reason: HOSPADM

## 2024-03-16 RX ORDER — BISACODYL 5 MG/1
5 TABLET, DELAYED RELEASE ORAL DAILY PRN
Status: DISCONTINUED | OUTPATIENT
Start: 2024-03-16 | End: 2024-03-18 | Stop reason: HOSPADM

## 2024-03-16 RX ORDER — PREGABALIN 300 MG/1
300 CAPSULE ORAL 2 TIMES DAILY
COMMUNITY

## 2024-03-16 RX ORDER — CYANOCOBALAMIN 1000 UG/ML
1000 INJECTION, SOLUTION INTRAMUSCULAR; SUBCUTANEOUS
COMMUNITY

## 2024-03-16 RX ORDER — FUROSEMIDE 20 MG/1
20 TABLET ORAL DAILY
Status: DISCONTINUED | OUTPATIENT
Start: 2024-03-16 | End: 2024-03-18 | Stop reason: HOSPADM

## 2024-03-16 RX ORDER — BUDESONIDE AND FORMOTEROL FUMARATE DIHYDRATE 160; 4.5 UG/1; UG/1
2 AEROSOL RESPIRATORY (INHALATION)
Status: DISCONTINUED | OUTPATIENT
Start: 2024-03-16 | End: 2024-03-18 | Stop reason: HOSPADM

## 2024-03-16 RX ORDER — POTASSIUM CHLORIDE 20 MEQ/1
20 TABLET, EXTENDED RELEASE ORAL 2 TIMES DAILY
COMMUNITY

## 2024-03-16 RX ORDER — PROMETHAZINE HYDROCHLORIDE 12.5 MG/1
12.5 TABLET ORAL EVERY 6 HOURS PRN
Status: DISCONTINUED | OUTPATIENT
Start: 2024-03-16 | End: 2024-03-18 | Stop reason: HOSPADM

## 2024-03-16 RX ORDER — BISACODYL 10 MG
10 SUPPOSITORY, RECTAL RECTAL DAILY PRN
Status: DISCONTINUED | OUTPATIENT
Start: 2024-03-16 | End: 2024-03-18 | Stop reason: HOSPADM

## 2024-03-16 RX ORDER — ATORVASTATIN CALCIUM 40 MG/1
40 TABLET, FILM COATED ORAL DAILY
Status: DISCONTINUED | OUTPATIENT
Start: 2024-03-16 | End: 2024-03-16

## 2024-03-16 RX ORDER — DIPHENHYDRAMINE HYDROCHLORIDE 50 MG/ML
25 INJECTION INTRAMUSCULAR; INTRAVENOUS EVERY 6 HOURS PRN
Status: DISCONTINUED | OUTPATIENT
Start: 2024-03-16 | End: 2024-03-18 | Stop reason: HOSPADM

## 2024-03-16 RX ORDER — CHOLECALCIFEROL (VITAMIN D3) 125 MCG
5 CAPSULE ORAL NIGHTLY PRN
Status: DISCONTINUED | OUTPATIENT
Start: 2024-03-16 | End: 2024-03-18 | Stop reason: HOSPADM

## 2024-03-16 RX ORDER — SODIUM CHLORIDE 9 MG/ML
40 INJECTION, SOLUTION INTRAVENOUS AS NEEDED
Status: DISCONTINUED | OUTPATIENT
Start: 2024-03-16 | End: 2024-03-18 | Stop reason: HOSPADM

## 2024-03-16 RX ORDER — ONDANSETRON 2 MG/ML
4 INJECTION INTRAMUSCULAR; INTRAVENOUS EVERY 6 HOURS PRN
Status: DISCONTINUED | OUTPATIENT
Start: 2024-03-16 | End: 2024-03-18 | Stop reason: HOSPADM

## 2024-03-16 RX ORDER — BUDESONIDE AND FORMOTEROL FUMARATE DIHYDRATE 160; 4.5 UG/1; UG/1
2 AEROSOL RESPIRATORY (INHALATION)
Status: DISCONTINUED | OUTPATIENT
Start: 2024-03-16 | End: 2024-03-16

## 2024-03-16 RX ORDER — PROPRANOLOL HCL 60 MG
120 CAPSULE, EXTENDED RELEASE 24HR ORAL DAILY
Status: DISCONTINUED | OUTPATIENT
Start: 2024-03-16 | End: 2024-03-18 | Stop reason: HOSPADM

## 2024-03-16 RX ORDER — FAMOTIDINE 20 MG/1
40 TABLET, FILM COATED ORAL DAILY
Status: DISCONTINUED | OUTPATIENT
Start: 2024-03-16 | End: 2024-03-16

## 2024-03-16 RX ORDER — ALUMINA, MAGNESIA, AND SIMETHICONE 2400; 2400; 240 MG/30ML; MG/30ML; MG/30ML
15 SUSPENSION ORAL EVERY 6 HOURS PRN
Status: DISCONTINUED | OUTPATIENT
Start: 2024-03-16 | End: 2024-03-18 | Stop reason: HOSPADM

## 2024-03-16 RX ORDER — NITROGLYCERIN 0.4 MG/1
0.4 TABLET SUBLINGUAL
Status: DISCONTINUED | OUTPATIENT
Start: 2024-03-16 | End: 2024-03-18 | Stop reason: HOSPADM

## 2024-03-16 RX ORDER — AMOXICILLIN 250 MG
2 CAPSULE ORAL 2 TIMES DAILY PRN
Status: DISCONTINUED | OUTPATIENT
Start: 2024-03-16 | End: 2024-03-18 | Stop reason: HOSPADM

## 2024-03-16 RX ORDER — ALBUTEROL SULFATE 90 UG/1
2 AEROSOL, METERED RESPIRATORY (INHALATION)
Status: DISCONTINUED | OUTPATIENT
Start: 2024-03-16 | End: 2024-03-18 | Stop reason: HOSPADM

## 2024-03-16 RX ORDER — ACETAMINOPHEN 325 MG/1
650 TABLET ORAL EVERY 4 HOURS PRN
Status: DISCONTINUED | OUTPATIENT
Start: 2024-03-16 | End: 2024-03-18 | Stop reason: HOSPADM

## 2024-03-16 RX ORDER — ONDANSETRON 4 MG/1
4 TABLET, ORALLY DISINTEGRATING ORAL EVERY 6 HOURS PRN
Status: DISCONTINUED | OUTPATIENT
Start: 2024-03-16 | End: 2024-03-18 | Stop reason: HOSPADM

## 2024-03-16 RX ORDER — PANTOPRAZOLE SODIUM 40 MG/1
40 TABLET, DELAYED RELEASE ORAL
Status: DISCONTINUED | OUTPATIENT
Start: 2024-03-16 | End: 2024-03-18 | Stop reason: HOSPADM

## 2024-03-16 RX ORDER — VANCOMYCIN 1.75 GRAM/500 ML IN 0.9 % SODIUM CHLORIDE INTRAVENOUS
20 ONCE
Qty: 500 ML | Refills: 0 | Status: COMPLETED | OUTPATIENT
Start: 2024-03-16 | End: 2024-03-16

## 2024-03-16 RX ORDER — GUAIFENESIN/DEXTROMETHORPHAN 100-10MG/5
5 SYRUP ORAL EVERY 4 HOURS PRN
Status: DISCONTINUED | OUTPATIENT
Start: 2024-03-16 | End: 2024-03-16

## 2024-03-16 RX ORDER — SODIUM CHLORIDE 0.9 % (FLUSH) 0.9 %
10 SYRINGE (ML) INJECTION EVERY 12 HOURS SCHEDULED
Status: DISCONTINUED | OUTPATIENT
Start: 2024-03-16 | End: 2024-03-18 | Stop reason: HOSPADM

## 2024-03-16 RX ORDER — DICYCLOMINE HYDROCHLORIDE 10 MG/1
10 CAPSULE ORAL
COMMUNITY

## 2024-03-16 RX ORDER — GUAIFENESIN 200 MG/10ML
200 LIQUID ORAL EVERY 4 HOURS PRN
Status: DISCONTINUED | OUTPATIENT
Start: 2024-03-16 | End: 2024-03-18 | Stop reason: HOSPADM

## 2024-03-16 RX ORDER — BUPROPION HYDROCHLORIDE 450 MG/1
450 TABLET, FILM COATED, EXTENDED RELEASE ORAL DAILY
COMMUNITY

## 2024-03-16 RX ORDER — LIDOCAINE 4 G/G
1 PATCH TOPICAL
Status: DISCONTINUED | OUTPATIENT
Start: 2024-03-16 | End: 2024-03-18 | Stop reason: HOSPADM

## 2024-03-16 RX ADMIN — CEFTRIAXONE 2000 MG: 2 INJECTION, POWDER, FOR SOLUTION INTRAMUSCULAR; INTRAVENOUS at 15:55

## 2024-03-16 RX ADMIN — MILNACIPRAN HYDROCHLORIDE 100 MG: 25 TABLET, FILM COATED ORAL at 20:59

## 2024-03-16 RX ADMIN — PROMETHAZINE HYDROCHLORIDE 12.5 MG: 12.5 TABLET ORAL at 22:30

## 2024-03-16 RX ADMIN — Medication 10 ML: at 20:59

## 2024-03-16 RX ADMIN — AZTREONAM 2 G: 2 INJECTION, POWDER, LYOPHILIZED, FOR SOLUTION INTRAMUSCULAR; INTRAVENOUS at 11:16

## 2024-03-16 RX ADMIN — ONDANSETRON 4 MG: 2 INJECTION INTRAMUSCULAR; INTRAVENOUS at 22:03

## 2024-03-16 RX ADMIN — PANTOPRAZOLE SODIUM 40 MG: 40 TABLET, DELAYED RELEASE ORAL at 15:53

## 2024-03-16 RX ADMIN — HYDROMORPHONE HYDROCHLORIDE 0.5 MG: 1 INJECTION, SOLUTION INTRAMUSCULAR; INTRAVENOUS; SUBCUTANEOUS at 17:48

## 2024-03-16 RX ADMIN — FUROSEMIDE 20 MG: 40 TABLET ORAL at 15:54

## 2024-03-16 RX ADMIN — Medication 1750 MG: at 11:59

## 2024-03-16 RX ADMIN — LIDOCAINE 1 PATCH: 4 PATCH TOPICAL at 18:56

## 2024-03-16 RX ADMIN — Medication 10 ML: at 15:56

## 2024-03-16 RX ADMIN — PANTOPRAZOLE SODIUM 40 MG: 40 TABLET, DELAYED RELEASE ORAL at 16:00

## 2024-03-16 RX ADMIN — NIRMATRELVIR AND RITONAVIR 3 TABLET: KIT at 20:59

## 2024-03-16 NOTE — ED NOTES
PT presented with report of confusion and flu like symptoms. Upon assessment pt oriented to self , eyes closed but responsive to stimuli and able to response to questions .  Pt states she has had diarrhea has not eaten in 4 days. Pt confirms extensive history of allergies and has just felt worse since getting blood work done Monday at [facility].

## 2024-03-16 NOTE — ED NOTES
Nursing report ED to floor  Omaira Roque  68 y.o.  female    HPI:   Chief Complaint   Patient presents with    Altered Mental Status     Altered mental status,  states she has been sick since Monday.  He reports she has been vomiting and diarrhea, not eating.  Pt able to give any information at triage, could not express Alert to name only.  Pt was at PMD on Monday.           Admitting doctor:   Bhaskar Washington MD    Admitting diagnosis:   The primary encounter diagnosis was Acute UTI. Diagnoses of COVID-19, Pneumonia of both lower lobes due to infectious organism, and Altered mental status, unspecified altered mental status type were also pertinent to this visit.    Code status:   Current Code Status       Date Active Code Status Order ID Comments User Context       3/16/2024 1717 CPR (Attempt to Resuscitate) [id number]  Bhaskar Washington MD ED        Question Answer    Code Status (Patient has no pulse and is not breathing) CPR (Attempt to Resuscitate)    Medical Interventions (Patient has pulse or is breathing) Full Support    Level Of Support Discussed With Patient                    Allergies:   Ace inhibitors, Amitriptyline hcl, Amoxicillin, Carbamazepine, Cefdinir, Metronidazole, Morphine, Penicillins, Pregabalin, and Synthroid  [levothyroxine sodium]    Isolation:  Enhanced Droplet/Contact      Fall Risk:  Fall Risk Assessment was completed, and patient is at moderate risk for falls.   Predictive Model Details         48 (Low) Factor Value    Calculated 3/16/2024 19:17 Age 68    Risk of Fall Model [place] Coma Scale 13     Musculoskeletal Assessment WDL     Active Peripheral IV Present     Imaging order in this encounter Present     Respiratory Rate 24     Number of Distinct Medication Classes administered 7     Skin Assessment WDL     Financial Class Other     Magnesium not on file     Drug Use No     Efe Scale not on file     Peripheral Vascular Assessment WDL     Diastolic BP 79     Total Bilirubin 0.7  "mg/dL     Number of administrations of Ulcer Drugs 1     Number of administrations of Analgesic Narcotics 1     Cardiac Assessment WDL     Days after Admission 0.368     ALT 22 U/L     Gastrointestinal Assessment X     Creatinine 0.9 mg/dL     Chloride 103 mmol/L     Albumin 4.6 g/dL     Calcium 9.7 mg/dL     Potassium 3.7 mmol/L         Weight:       03/16/24  1026   Weight: 84.8 kg (187 lb)       Intake and Output    Intake/Output Summary (Last 24 hours) at 3/16/2024 1917  Last data filed at 3/16/2024 1704  Gross per 24 hour   Intake 700 ml   Output --   Net 700 ml       Diet:   Dietary Orders (From admission, onward)       Start     Ordered    03/16/24 1433  Diet: Regular/House; Fluid Consistency: Thin (IDDSI 0)  Diet Effective Now        References:    Diet Order [place]   Question Answer Comment   Diets: Regular/House    Fluid Consistency: Thin (IDDSI 0)        03/16/24 1433                     Most recent vitals:   Vitals:    03/16/24 1026 03/16/24 1401 03/16/24 1422 03/16/24 1441   BP: 149/95 130/79     BP Location: Left arm      Patient Position: Sitting      Pulse: 114 92 86    Resp: 24      Temp: 98.5 °F (36.9 °C)   98.4 °F (36.9 °C)   TempSrc: Oral   Oral   SpO2: 96% 99% 98%    Weight: 84.8 kg (187 lb)      Height: 154.9 cm (61\")          Active LDAs/IV Access:   Lines, Drains & Airways       Active LDAs       Name Placement date Placement time Site Days    Peripheral IV 03/16/24 1054 Left Antecubital 03/16/24  1054  Antecubital  less than 1                    Skin Condition:   Skin Assessments (last day)       None             Labs (abnormal labs have a star):   Labs Reviewed   COVID-19/FLUA&B/RSV, NP SWAB IN TRANSPORT MEDIA 1 HR TAT - Abnormal; Notable for the following components:       Result Value    COVID19 Detected (*)     All other components within normal limits    Narrative:     Fact sheet for providers: https://www.fda.gov/media/368305/download    Fact sheet for patients: " https://www.fda.gov/media/160053/download    Test performed by PCR.   COMPREHENSIVE METABOLIC PANEL - Abnormal; Notable for the following components:    Glucose 131 (*)     CO2 17.0 (*)     Anion Gap 19.0 (*)     All other components within normal limits    Narrative:     GFR Normal >60  Chronic Kidney Disease <60  Kidney Failure <15     CBC WITH AUTO DIFFERENTIAL - Abnormal; Notable for the following components:    RBC 5.48 (*)     MCH 25.7 (*)     MCHC 30.9 (*)     RDW 16.3 (*)     Platelets 464 (*)     All other components within normal limits   URINALYSIS W/ MICROSCOPIC IF INDICATED (NO CULTURE) - Abnormal; Notable for the following components:    Color, UA Dark Yellow (*)     Appearance, UA Turbid (*)     Ketones, UA 40 mg/dL (2+) (*)     Bilirubin, UA Small (1+) (*)     Protein, UA Trace (*)     Leuk Esterase, UA Trace (*)     All other components within normal limits   TSH - Abnormal; Notable for the following components:    TSH 5.380 (*)     All other components within normal limits   URINALYSIS, MICROSCOPIC ONLY - Abnormal; Notable for the following components:    WBC, UA 3-5 (*)     Bacteria, UA 3+ (*)     Squamous Epithelial Cells, UA 3-6 (*)     Mucus, UA Small/1+ (*)     All other components within normal limits   MRSA SCREEN, PCR - Normal    Narrative:     The negative predictive value of this diagnostic test is high and should only be used to consider de-escalating anti-MRSA therapy. A positive result may indicate colonization with MRSA and must be correlated clinically.   SINGLE HSTROPONIN T - Normal    Narrative:     High Sensitive Troponin T Reference Range:  <14.0 ng/L- Negative Female for AMI  <22.0 ng/L- Negative Male for AMI  >=14 - Abnormal Female indicating possible myocardial injury.  >=22 - Abnormal Male indicating possible myocardial injury.   Clinicians would have to utilize clinical acumen, EKG, Troponin, and serial changes to determine if it is an Acute Myocardial Infarction or  "myocardial injury due to an underlying chronic condition.        AMMONIA - Normal   URINE DRUG SCREEN - Normal    Narrative:     Negative Thresholds Per Drugs Screened:    Amphetamines                 500 ng/ml  Barbiturates                 200 ng/ml  Benzodiazepines              100 ng/ml  Cocaine                      300 ng/ml  Methadone                    300 ng/ml  Opiates                      300 ng/ml  Oxycodone                    100 ng/ml  THC                           50 ng/ml    The Normal Value for all drugs tested is negative. This report includes final unconfirmed screening results to be used for medical treatment purposes only. Unconfirmed results must not be used for non-medical purposes such as employment or legal testing. Clinical consideration should be applied to any drug of abuse test, particularly when unconfirmed results are used.          All urine drugs of abuse requests without chain of custody are for medical screening purposes only.  False positives are possible.     PROCALCITONIN - Normal    Narrative:     As a Marker for Sepsis (Non-Neonates):    1. <0.5 ng/mL represents a low risk of severe sepsis and/or septic shock.  2. >2 ng/mL represents a high risk of severe sepsis and/or septic shock.    As a Marker for Lower Respiratory Tract Infections that require antibiotic therapy:    PCT on Admission    Antibiotic Therapy       6-12 Hrs later    >0.5                Strongly Recommended  >0.25 - <0.5        Recommended   0.1 - 0.25          Discouraged              Remeasure/reassess PCT  <0.1                Strongly Discouraged     Remeasure/reassess PCT    As 28 day mortality risk marker: \"Change in Procalcitonin Result\" (>80% or <=80%) if Day 0 (or Day 1) and Day 4 values are available. Refer to http://www.[employer]-pct-calculator.com    Change in PCT <=80%  A decrease of PCT levels below or equal to 80% defines a positive change in PCT test result representing a higher risk for 28-day " "all-cause mortality of patients diagnosed with severe sepsis for septic shock.    Change in PCT >80%  A decrease of PCT levels of more than 80% defines a negative change in PCT result representing a lower risk for 28-day all-cause mortality of patients diagnosed with severe sepsis or septic shock.      C-REACTIVE PROTEIN - Normal   LACTATE DEHYDROGENASE - Normal   D-DIMER, QUANTITATIVE - Normal    Narrative:     According to the assay 's published package insert, a normal (<0.50 mg/L (FEU)) D-dimer result in conjunction with a non-high clinical probability assessment, excludes deep vein thrombosis (DVT) and pulmonary embolism (PE) with high sensitivity.    D-dimer values increase with age and this can make VTE exclusion of an older population difficult. To address this, the [employer], based on best available evidence and recent guidelines, recommends that clinicians use age-adjusted D-dimer thresholds in patients greater than 50 years of age with: a) a low probability of PE who do not meet all Pulmonary Embolism Rule Out Criteria, or b) in those with intermediate probability of PE.   The formula for an age-adjusted D-dimer cut-off is \"age/100\".  For example, a 60 year old patient would have an age-adjusted cut-off of 0.60 mg/L (FEU) and an 80 year old 0.80 mg/L (FEU).   POC LACTATE - Normal   BLOOD CULTURE   BLOOD CULTURE   URINE CULTURE   LEGIONELLA ANTIGEN, URINE   STREP PNEUMO AG, URINE OR CSF   RAINBOW DRAW    Narrative:     The following orders were created for panel order [place] Draw.  Procedure                               Abnormality         Status                     ---------                               -----------         ------                     Green Top (Gel)[[id number]]                                  Final result               Lavender Top[[id number]]                                     Final result               Gold Top - SST[[id number]]                          "          Final result               Light Blue Top[[id number]]                                   Final result                 Please view results for these tests on the individual orders.   ETHANOL    Narrative:     Plasma Ethanol Clinical Symptoms:    ETOH (%)               Clinical Symptom  .01-.05              No apparent influence  .03-.12              Euphoria, Diminished judgment and attention   .09-.25              Impaired comprehension, Muscle incoordination  .18-.30              Confusion, Staggered gait, Slurred speech  .25-.40              Markedly decreased response to stimuli, unable to stand or                        walk, vomitting, sleep or stupor  .35-.50              Comatose, Anesthesia, Subnormal body temperature       POC LACTATE   CBC AND DIFFERENTIAL    Narrative:     The following orders were created for panel order CBC & Differential.  Procedure                               Abnormality         Status                     ---------                               -----------         ------                     CBC Auto Differential[[id number]]        Abnormal            Final result                 Please view results for these tests on the individual orders.   GREEN TOP   LAVENDER TOP   GOLD TOP - [facility]   LIGHT BLUE TOP       LOC: Person, Place, Time, and Situation    Telemetry:  Med/Surg      Cardiac Monitoring Ordered: yes    EKG:   ECG 12 Lead Altered Mental Status   Preliminary Result   HEART RATE= 96  bpm   RR Interval= 628  ms   ND Interval= 134  ms   P Horizontal Axis= 10  deg   P Front Axis= 70  deg   QRSD Interval= 84  ms   QT Interval= 381  ms   QTcB= 481  ms   QRS Axis= 0  deg   T Wave Axis= 74  deg   - ABNORMAL ECG -   Sinus rhythm   Inferior infarct, old   Electronically Signed By:    Date and Time of Study: 2024-03-16 10:33:29          Medications Given in the ED:   Medications   sodium chloride 0.9 % flush 10 mL (has no administration in time range)   sodium chloride 0.9 % flush 10  mL (10 mL Intravenous Given 3/16/24 1556)   sodium chloride 0.9 % flush 10 mL (has no administration in time range)   sodium chloride 0.9 % infusion 40 mL (has no administration in time range)   nitroglycerin (NITROSTAT) SL tablet 0.4 mg (has no administration in time range)   Potassium Replacement - Follow Nurse / BPA Driven Protocol (has no administration in time range)   Magnesium Standard Dose Replacement - Follow Nurse / BPA Driven Protocol (has no administration in time range)   Phosphorus Replacement - Follow Nurse / BPA Driven Protocol (has no administration in time range)   Calcium Replacement - Follow Nurse / BPA Driven Protocol (has no administration in time range)   acetaminophen (TYLENOL) tablet 650 mg (has no administration in time range)   sennosides-docusate (PERICOLACE) 8.6-50 MG per tablet 2 tablet (has no administration in time range)     And   polyethylene glycol (MIRALAX) packet 17 g (has no administration in time range)     And   bisacodyl (DULCOLAX) EC tablet 5 mg (has no administration in time range)     And   bisacodyl (DULCOLAX) suppository 10 mg (has no administration in time range)   melatonin tablet 5 mg (has no administration in time range)   ondansetron ODT (ZOFRAN-ODT) disintegrating tablet 4 mg (has no administration in time range)     Or   ondansetron (ZOFRAN) injection 4 mg (has no administration in time range)   aluminum-magnesium hydroxide-simethicone (MAALOX MAX) 400-400-40 MG/5ML suspension 15 mL (has no administration in time range)   nirmatrelvir and ritonavir (300mg/100mg)(PAXLOVID) 3 tablet pack (has no administration in time range)   cefTRIAXone (ROCEPHIN) 2,000 mg in sodium chloride 0.9 % 100 mL MBP (0 mg Intravenous Stopped 3/16/24 1704)   diphenhydrAMINE (BENADRYL) injection 25 mg (has no administration in time range)   albuterol sulfate HFA (PROVENTIL HFA;VENTOLIN HFA;PROAIR HFA) inhaler 2 puff (2 puffs Inhalation Not Given 3/16/24 1537)   budesonide-formoterol  (SYMBICORT) 160-4.5 MCG/ACT inhaler 2 puff (has no administration in time range)   buPROPion XL (WELLBUTRIN XL) 24 hr tablet 450 mg ( Oral Unheld by provider 3/16/24 1616)   pantoprazole (PROTONIX) EC tablet 40 mg (40 mg Oral Given 3/16/24 1553)   furosemide (LASIX) tablet 20 mg (20 mg Oral Given 3/16/24 1554)   promethazine (PHENERGAN) tablet 12.5 mg (has no administration in time range)   milnacipran (SAVELLA) tablet 100 mg (has no administration in time range)   propranolol LA (INDERAL LA) 24 hr capsule 120 mg ( Oral Dose Auto Held 3/24/24 0900)   guaifenesin (ROBITUSSIN) 100 MG/5ML liquid 200 mg (has no administration in time range)   Lidocaine 4 % 1 patch (1 patch Transdermal Medication Applied 3/16/24 1856)   HYDROmorphone (DILAUDID) injection 0.5 mg (0.5 mg Intravenous Given 3/16/24 1748)   aztreonam (AZACTAM) 2 g in sodium chloride 0.9 % 100 mL MBP (0 g Intravenous Stopped 3/16/24 1230)   vancomycin IVPB 1750 mg in 0.9% Sodium Chloride (premix) 500 mL (0 mg Intravenous Stopped 3/16/24 1405)       Imaging results:  CT Head Without Contrast    Result Date: 3/16/2024  Impression: No acute intracranial abnormality Electronically Signed: Dexter Pino MD  3/16/2024 11:43 AM EDT  Workstation ID: [id number]    XR Chest 1 View    Result Date: 3/16/2024  Impression: Evidence for potential developing bibasilar pneumonia. Recommend correlation for signs or symptoms of acute infection and follow-up to ensure resolution. Electronically Signed: Jace Sidhu MD  3/16/2024 11:11 AM EDT  Workstation ID: [id number]     Social issues:   Social History     Socioeconomic History    Marital status:    Tobacco Use    Smoking status: Never    Smokeless tobacco: Never   Substance and Sexual Activity    Alcohol use: No    Drug use: Never    Sexual activity: Defer       NIH Stroke Scale:  Interval: (not recorded)  1a. Level of Consciousness: (not recorded)  1b. LOC Questions: (not recorded)  1c. LOC Commands: (not  recorded)  2. Best Gaze: (not recorded)  3. Visual: (not recorded)  4. Facial Palsy: (not recorded)  5a. Motor Arm, Left: (not recorded)  5b. Motor Arm, Right: (not recorded)  6a. Motor Leg, Left: (not recorded)  6b. Motor Leg, Right: (not recorded)  7. Limb Ataxia: (not recorded)  8. Sensory: (not recorded)  9. Best Language: (not recorded)  10. Dysarthria: (not recorded)  11. Extinction and Inattention (formerly Neglect): (not recorded)    Total (NIH Stroke Scale): (not recorded)     Additional notable assessment information:na     Nursing report ED to floor:  car Moore RN   03/16/24 19:17 EDT

## 2024-03-16 NOTE — ED NOTES
Pt obviously confused and is repetitive talking ask to leave . Pt disorientated to time/date/location and was placed on a bed alarm.  is stating he has to go feed the animals.

## 2024-03-16 NOTE — ED PROVIDER NOTES
"Subjective   History of Present Illness  Patient is a 68-year-old obese  female with a history of hypertension and back pain who presents the emergency room with family who reports the patient has been increasingly confused since Monday after being seen by her primary care provider.  Patient states that it is \"hurt to pee\" and she has had right-sided flank pain.  She has also had nausea, vomiting and diarrhea progressing this week as well.  Has been also states that she has had a dry cough.  She has had no chest pain, fever, shortness of breath or dizziness.   does not believe patient has been taking her at home medications and knows that she takes something for thyroid and blood pressure.  Patient has allergies to penicillin, amoxicillin, cefdinir, Synthroid and carbamazepine.    PCP: Gordon      Review of Systems   Constitutional:  Negative for appetite change and fever.   Respiratory:  Negative for shortness of breath.    Cardiovascular:  Negative for chest pain.   Gastrointestinal:  Positive for diarrhea, nausea and vomiting. Negative for abdominal pain.   Genitourinary:  Positive for dysuria and flank pain. Negative for urgency.   Neurological:  Negative for syncope.   Psychiatric/Behavioral:  Positive for confusion. The patient is not nervous/anxious.    All other systems reviewed and are negative.      Past Medical History:   Diagnosis Date    Back pain     Hip pain     Neck pain        Allergies   Allergen Reactions    Ace Inhibitors Unknown (See Comments) and Other (See Comments)    Amitriptyline Hcl Unknown (See Comments) and Other (See Comments)    Amoxicillin Anaphylaxis    Carbamazepine Unknown (See Comments) and Other (See Comments)    Cefdinir Shortness Of Breath    Metronidazole Nausea Only    Morphine Palpitations, Other (See Comments) and Unknown (See Comments)    Penicillins Anaphylaxis    Pregabalin Other (See Comments)    Synthroid  [Levothyroxine Sodium] Rash       Past " "Surgical History:   Procedure Laterality Date    GASTRIC SLEEVE LAPAROSCOPIC  05/29/2019    HYSTERECTOMY      REDUCTION MAMMAPLASTY Bilateral 2005       Family History   Problem Relation Age of Onset    Dementia Mother     Heart attack Father        Social History     Socioeconomic History    Marital status:    Tobacco Use    Smoking status: Never    Smokeless tobacco: Never   Substance and Sexual Activity    Alcohol use: No    Drug use: Never    Sexual activity: Defer           Objective   Physical Exam  Vitals and nursing note reviewed.   Constitutional:       General: She is not in acute distress.     Appearance: She is well-developed. She is not ill-appearing.   HENT:      Head: Normocephalic and atraumatic.   Cardiovascular:      Rate and Rhythm: Regular rhythm. Tachycardia present.      Heart sounds: Normal heart sounds. No murmur heard.  Pulmonary:      Effort: No respiratory distress.      Breath sounds: Normal breath sounds.   Abdominal:      General: Bowel sounds are normal. There is no distension.   Musculoskeletal:         General: Normal range of motion.   Neurological:      Mental Status: She is alert. She is confused.         Procedures           ED Course      /95 (BP Location: Left arm, Patient Position: Sitting)   Pulse 114   Temp 98.5 °F (36.9 °C) (Oral)   Resp 24   Ht 154.9 cm (61\")   Wt 84.8 kg (187 lb)   SpO2 96%   BMI 35.33 kg/m²   Labs Reviewed   COVID-19/FLUA&B/RSV, NP SWAB IN TRANSPORT MEDIA 1 HR TAT - Abnormal; Notable for the following components:       Result Value    COVID19 Detected (*)     All other components within normal limits    Narrative:     Fact sheet for providers: https://www.fda.gov/media/204709/download    Fact sheet for patients: https://www.fda.gov/media/195707/download    Test performed by PCR.   COMPREHENSIVE METABOLIC PANEL - Abnormal; Notable for the following components:    Glucose 131 (*)     CO2 17.0 (*)     Anion Gap 19.0 (*)     All other " components within normal limits    Narrative:     GFR Normal >60  Chronic Kidney Disease <60  Kidney Failure <15     CBC WITH AUTO DIFFERENTIAL - Abnormal; Notable for the following components:    RBC 5.48 (*)     MCH 25.7 (*)     MCHC 30.9 (*)     RDW 16.3 (*)     Platelets 464 (*)     All other components within normal limits   URINALYSIS W/ MICROSCOPIC IF INDICATED (NO CULTURE) - Abnormal; Notable for the following components:    Color, UA Dark Yellow (*)     Appearance, UA Turbid (*)     Ketones, UA 40 mg/dL (2+) (*)     Bilirubin, UA Small (1+) (*)     Protein, UA Trace (*)     Leuk Esterase, UA Trace (*)     All other components within normal limits   TSH - Abnormal; Notable for the following components:    TSH 5.380 (*)     All other components within normal limits   URINALYSIS, MICROSCOPIC ONLY - Abnormal; Notable for the following components:    WBC, UA 3-5 (*)     Bacteria, UA 3+ (*)     Squamous Epithelial Cells, UA 3-6 (*)     Mucus, UA Small/1+ (*)     All other components within normal limits   SINGLE HSTROPONIN T - Normal    Narrative:     High Sensitive Troponin T Reference Range:  <14.0 ng/L- Negative Female for AMI  <22.0 ng/L- Negative Male for AMI  >=14 - Abnormal Female indicating possible myocardial injury.  >=22 - Abnormal Male indicating possible myocardial injury.   Clinicians would have to utilize clinical acumen, EKG, Troponin, and serial changes to determine if it is an Acute Myocardial Infarction or myocardial injury due to an underlying chronic condition.        URINE DRUG SCREEN - Normal    Narrative:     Negative Thresholds Per Drugs Screened:    Amphetamines                 500 ng/ml  Barbiturates                 200 ng/ml  Benzodiazepines              100 ng/ml  Cocaine                      300 ng/ml  Methadone                    300 ng/ml  Opiates                      300 ng/ml  Oxycodone                    100 ng/ml  THC                           50 ng/ml    The Normal Value for  all drugs tested is negative. This report includes final unconfirmed screening results to be used for medical treatment purposes only. Unconfirmed results must not be used for non-medical purposes such as employment or legal testing. Clinical consideration should be applied to any drug of abuse test, particularly when unconfirmed results are used.          All urine drugs of abuse requests without chain of custody are for medical screening purposes only.  False positives are possible.     POC LACTATE - Normal   BLOOD CULTURE   BLOOD CULTURE   URINE CULTURE   RAINBOW DRAW    Narrative:     The following orders were created for panel order [place] Draw.  Procedure                               Abnormality         Status                     ---------                               -----------         ------                     Green Top (Gel)[[id number]]                                  Final result               Lavender Top[[id number]]                                     Final result               Gold Top - SST[[id number]]                                   Final result               Light Blue Top[[id number]]                                   Final result                 Please view results for these tests on the individual orders.   ETHANOL    Narrative:     Plasma Ethanol Clinical Symptoms:    ETOH (%)               Clinical Symptom  .01-.05              No apparent influence  .03-.12              Euphoria, Diminished judgment and attention   .09-.25              Impaired comprehension, Muscle incoordination  .18-.30              Confusion, Staggered gait, Slurred speech  .25-.40              Markedly decreased response to stimuli, unable to stand or                        walk, vomitting, sleep or stupor  .35-.50              Comatose, Anesthesia, Subnormal body temperature       AMMONIA   POC LACTATE   CBC AND DIFFERENTIAL    Narrative:     The following orders were created for panel order CBC &  Differential.  Procedure                               Abnormality         Status                     ---------                               -----------         ------                     CBC Auto Differential[[id number]]        Abnormal            Final result                 Please view results for these tests on the individual orders.   GREEN TOP   LAVENDER TOP   GOLD TOP - SST   LIGHT BLUE TOP     Medications   sodium chloride 0.9 % flush 10 mL (has no administration in time range)   aztreonam (AZACTAM) 2 g in sodium chloride 0.9 % 100 mL MBP (0 g Intravenous Stopped 3/16/24 1230)   vancomycin IVPB 1750 mg in 0.9% Sodium Chloride (premix) 500 mL (0 mg Intravenous Stopped 3/16/24 1405)     CT Head Without Contrast    Result Date: 3/16/2024  Impression: No acute intracranial abnormality Electronically Signed: Dexter Pino MD  3/16/2024 11:43 AM EDT  Workstation ID: [id number]    XR Chest 1 View    Result Date: 3/16/2024  Impression: Evidence for potential developing bibasilar pneumonia. Recommend correlation for signs or symptoms of acute infection and follow-up to ensure resolution. Electronically Signed: Jace Sidhu MD  3/16/2024 11:11 AM EDT  Workstation ID: [id number]                                          Medical Decision Making  Problems Addressed:  Acute UTI: complicated acute illness or injury  Altered mental status, unspecified altered mental status type: complicated acute illness or injury  COVID-19: complicated acute illness or injury  Pneumonia of both lower lobes due to infectious organism: complicated acute illness or injury    Amount and/or Complexity of Data Reviewed  Labs: ordered. Decision-making details documented in ED Course.  Radiology: ordered. Decision-making details documented in ED Course.  ECG/medicine tests: ordered.     Details: My interpretation of EKG reveals sinus rhythm with a regular rate of 96.  No acute ST elevation or ectopy.  No significant change compared to previous  study completed 3/7/2017.  EKG was also reviewed by Dr. Jones who is agreeable to my interpretation.    Risk  Prescription drug management.  Decision regarding hospitalization.    Patient is a pleasant 68-year-old  female with a history of hypertension and hypothyroidism who presents to the emergency room from home with family with reported altered mental status that has been ongoing and worsening since Monday.  Family at bedside states that patient has been sick for 4 to 5 days and is not answering questions appropriately.  On exam, patient is alert but does not know where she is or why she is here.  She has normal S1/S2 without clicks murmurs.  She is tachycardic and tachypneic without diaphoresis.  No JVD or leg swelling.  Lungs are clear to auscultation in all fields.  Her abdomen is soft and nontender with normal bowel sounds throughout.  No CVA tenderness.  Initial differentials include acute UTI, CVA/TIA, pneumonia, COVID-19.  This is not a complete list.    IV was established labs were obtained.  Patient received above examination.  She screened positive for simple sepsis and cultures were collected with lactic acid, which was found to be normal.  She received antibiotics for presenting treatment of acute UTI given her symptoms.  Her CBC is without leukocytosis or anemia.  Her CMP reveals normal kidney function and LFTs.  Troponin is within normal limits.  Her urine is concerning for acute UTI with trace leukocytes, bacteria and white blood cells.  Urine culture was ordered as well.  COVID-19 detected on swab with no influenza or RSV.  My interpretation of chest x-ray reveals no pneumothorax or nodules but there are mild infiltrates in bilateral bases concerning for developing pneumonia.  Additionally, her head CT is without findings consistent with acute abnormality.  These are concurrent with interpretations from radiologist.  Upon reassessment, patient reiterates that she is ready to go home but  still cannot answer questions appropriately and I do not believe she is able to make decisions for herself.  Primary nurse reached out to patient's family, who is agreeable to her staying in the hospital at this time for treatment of acute UTI and pneumonia.  Based on the clinical findings, at this time I anticipate the patient will require a 2 midnight stay.  I discussed the patient with Dr. Boudreaux from the hospitalist group who is agreeable to the plan of care and has accepted patient for admission.    Final diagnoses:   Acute UTI   COVID-19   Pneumonia of both lower lobes due to infectious organism   Altered mental status, unspecified altered mental status type       ED Disposition  ED Disposition       ED Disposition   Decision to Admit    Condition   --    Comment   Level of Care: Telemetry [5]   Admitting Physician: ZULLY BOUDREAUX [[id number]]   Attending Physician: ZULLY BOUDREAUX [[id number]]   Bed Request Comments: letitia                 No follow-up provider specified.         Medication List      No changes were made to your prescriptions during this visit.              Trudy Gr, APRN  03/16/24 1429

## 2024-03-16 NOTE — H&P
History and Physical   Omaira Roque : 1955 MRN:[id number] LOS:0     Reason for admission: AMS (altered mental status)     Assessment / Plan     #Altered mental status secondary to infection related encephalopathy from UTI/community-acquired pneumonia/COVID infection  -Patient presented with altered mental status for 5 days prior to admission.  She is unable to answer the question appropriately.  In the ED patient does not know where she is and why she is in the hospital and wants to go home.  -Hemodynamically stable in the ED labs showing COVID-positive UTI positive.  Chest x-ray with bilateral pneumonia.  No significant leukocytosis.  Non anion gap metabolic acidosis present.  Lactic acid is normal.  -CT head without any acute changes.  -Patient was given aztreonam and vancomycin in the ED given patient allergic reaction to penicillin and Cefdinir.   -Given low cross-reactivity of Rocephin with penicillin and cefdinir, IV Rocephin is ordered to cover both UTI and pneumonia.  Pharmacy teams is aware of the plan.  Benadryl is ordered just in case.  We can try 1 dose of Rocephin if it is she cannot tolerate can change to another appropriate antibiotic at the time.  -For COVID infection patient does not any oxygen requirement in the ED and started on baricitinib. No steroid needed for now.  Covid precaution to continue.  -Follow-up inflammatory markers.  Urine culture and blood culture to follow-up.  -Respiratory treatment started.  -PT OT CM     #Chronic back pain/neck pain  -Pain management to continue.  Patient has allergic reaction to morphine and pregabalin and amitriptyline.     #Hyperlipidemia  -Resume home medication once verified by pharmacy    Nutrition: Diet: Regular/House; Fluid Consistency: Thin (IDDSI 0)     DVT Prophylaxis:   Mechanical Order History:        Ordered        24 1433  Place Sequential Compression Device  Once            24 1433  Maintain Sequential Compression Device   Continuous                          Pharmalogical Order History:       None             History of Present illness     A 68 y.o. old female patient with PMH of hypothyroidism B12 deficiency hyperlipidemia neck pain and lower back pain migraine presents to the hospital with complaints of altered mental status for 5 days prior to admission.  Patient is recently follow-up with primary care physician on last Monday get the blood work done she was following.  Since then patient started having lethargy altered mental status and able to answer appropriately to the question.  In the ED patient is hemodynamically stable.  Found to be patient has UTI and COVID infection.  Chest x-ray with bilateral pneumonia.  Patient is given vancomycin and aztreonam in the ED given patient has multiple allergy.  Patient has an anion gap metabolic acidosis.  No significant leukocytosis.  Normal lactic acid.  TSH of 5.3.  No troponin leakage.      Patient will be admitted for encephalopathy secondary to COVID/UTI/bilateral pneumonia.  Will be started on IV Rocephin as Rocephin has low cross-reactivity with Cefdinir and penicillin.  Pharmacy team is aware of the plan.  We can try 1 dose and if it is she cannot tolerate can switch to other antibiotic.      Subjective / Review of systems     Review of Systems   Feeling ok   AAO 2   No CP abdo pain     Past Medical/Surgical/Social/Family History & Allergies     Past Medical History:   Diagnosis Date    Back pain     Hip pain     Neck pain       Past Surgical History:   Procedure Laterality Date    GASTRIC SLEEVE LAPAROSCOPIC  05/29/2019    HYSTERECTOMY      REDUCTION MAMMAPLASTY Bilateral 2005      Social History     Socioeconomic History    Marital status:    Tobacco Use    Smoking status: Never    Smokeless tobacco: Never   Substance and Sexual Activity    Alcohol use: No    Drug use: Never    Sexual activity: Defer      Family History   Problem Relation Age of Onset    Dementia Mother      Heart attack Father       Allergies   Allergen Reactions    Ace Inhibitors Unknown (See Comments) and Other (See Comments)    Amitriptyline Hcl Unknown (See Comments) and Other (See Comments)    Amoxicillin Anaphylaxis    Carbamazepine Unknown (See Comments) and Other (See Comments)    Cefdinir Shortness Of Breath    Metronidazole Nausea Only    Morphine Palpitations, Other (See Comments) and Unknown (See Comments)    Penicillins Anaphylaxis    Pregabalin Other (See Comments)    Synthroid  [Levothyroxine Sodium] Rash        Home Medications     Prior to Admission medications    Medication Sig Start Date End Date Taking? Authorizing Provider   buPROPion XL (FORFIVO XL) 450 MG 24 hr tablet Take 1 tablet by mouth Daily.   Yes Gil Meza MD   pregabalin (LYRICA) 300 MG capsule Take 1 capsule by mouth 2 (Two) Times a Day.   Yes Gil Meza MD   atorvastatin (LIPITOR) 40 MG tablet Take 1 tablet by mouth Daily. 6/8/18   Gil Meza MD   BRECATHERINE ELLIPTA 200-25 MCG/INH inhaler Inhale 1 puff Daily. 5/31/19   Gil Meza MD   cyanocobalamin 1000 MCG/ML injection Inject 1 mL into the appropriate muscle as directed by prescriber Every 7 (Seven) Days.    Gil Meza MD   DEXILANT 60 MG capsule TAKE 1 CAPSULE BY MOUTH EVERY DAY 9/26/19   Jannie Holland MD   dicyclomine (BENTYL) 10 MG capsule Take 1 capsule by mouth 4 (Four) Times a Day Before Meals & at Bedtime.    Gil Meza MD   furosemide (LASIX) 20 MG tablet TAKE 1 TABLET BY MOUTH EVERY DAY 7/1/19   Jannie Holland MD   galcanezumab-gnlm (EMGALITY) 120 MG/ML auto-injector pen Inject 1 mL under the skin into the appropriate area as directed 1 (One) Time.    Gil Meza MD   potassium chloride (KLOR-CON M20) 20 MEQ CR tablet Take 1 tablet by mouth 2 (Two) Times a Day.    Gil Meza MD   promethazine (PHENERGAN) 25 MG tablet Every 6 (Six) Hours. 9/11/18   Gil Meza MD  "  propranolol LA (INDERAL LA) 120 MG 24 hr capsule Take 1 capsule by mouth Daily. 5/15/19   Gil Meza MD   Savella 100 MG tablet Take 1 tablet by mouth Every 12 (Twelve) Hours. 10/9/23   Gil Meza MD   Thyroid (ARMOUR THYROID) 90 MG PO tablet Take 1 tablet by mouth. 3/22/19   Gil Meza MD   ubrogepant (Ubrelvy) 100 MG tablet See Admin Instructions. TAKE 1 TABLET BY MOUTH AS NEEDED FOR MIGRAINE IF NEEDED THEN TAKE 2ND DOSE AT LEAST 2 HOURS AFTER FIRST DOSE *MAX 2 TABS/24 HOURS*    Gil Meza MD   verapamil PM (VERELAN PM) 180 MG 24 hr capsule TAKE 1 CAPSULE BY MOUTH EVERY DAY 8/2/19   Jannie Holland MD   buPROPion XL (WELLBUTRIN XL) 300 MG 24 hr tablet Take 1 tablet by mouth Daily. 10/13/23 3/16/24  Gil Meza MD   cyanocobalamin 1000 MCG/ML injection INJECT 1 ML INTRAMUSCULARLY ONCE A WEEK 4/22/19 3/16/24  Gil Meza MD   dicyclomine (BENTYL) 10 MG capsule TAKE ONE CAPSULE BY MOUTH FOUR TIMES DAILY AS NEEDED 10/19/23 3/16/24  Gli Meza MD   famotidine (PEPCID) 40 MG tablet   3/16/24  Gil Meza MD   methocarbamol (ROBAXIN) 750 MG tablet  11/1/23 3/16/24  Gil Meza MD   PARoxetine (PAXIL) 40 MG tablet TAKE 1 TABLET BY MOUTH EVERY DAY 8/2/19 3/16/24  Jannie Holland MD   pregabalin (LYRICA) 300 MG capsule  10/9/23 3/16/24  Gil Meza MD   raNITIdine (ZANTAC) 150 MG capsule Take 1 capsule by mouth.  3/16/24  Gil Meza MD   Restasis 0.05 % ophthalmic emulsion instill 1 drop by ophthalmic route 2 times every day into both eyes 12/12/23 3/16/24  Gil Meza MD   Syringe/Needle, Disp, (BD ECLIPSE SYRINGE) 25G X 1\" 3 ML misc BD ECLIPSE SYRINGE 25G X 1\" 3 ML 6/6/16 3/16/24  Gil Meza MD   tiZANidine (ZANAFLEX) 4 MG tablet   3/16/24  Provider, MD Gil   Ubrelvy 100 MG tablet TAKE 1 TABLET BY MOUTH AS NEEDED FOR MIGRAINE IF NEEDED THEN TAKE 2ND DOSE AT LEAST 2 HOURS " AFTER FIRST DOSE *MAX 2 TABS/24 HOURS* 10/13/23 3/16/24  Provider, MD Gil      Objective / Physical Exam   Vital signs:  Temp: 98.4 °F (36.9 °C)  BP: 130/79  Heart Rate: 86  Resp: 24  SpO2: 98 %  Weight: 84.8 kg (187 lb)    Admission Weight: Weight: 84.8 kg (187 lb)    Physical Exam   Physical Exam  HENT:      Head: Normocephalic and atraumatic.      Nose: Nose normal.   Eyes:      Extraocular Movements: Extraocular movements intact.      Conjunctivae/sclera: Conjunctivae normal.      Pupils: Pupils are equal, round, and reactive to light.   Cardiovascular:      Rate and Rhythm: normal       Pulses: Normal pulses.      Heart sounds: Normal heart sounds.   Pulmonary:      Effort: normal      Breath sounds: normal   Abdominal:      General: Abdomen is flat. Bowel sounds are normal.      Palpations: Abdomen is soft.   Musculoskeletal:         General: Normal range of motion.      Cervical back: Normal range of motion and neck supple.   Skin:     General: Skin is dry.   Neurological:      General: No focal deficit present.      Mental Status: alert. AAO 2   Psychiatric:         Mood and Affect: Mood normal.        Labs     Results from last 7 days   Lab Units 03/16/24  1050   WBC 10*3/mm3 7.12   HEMATOCRIT % 45.6   PLATELETS 10*3/mm3 464*      Results from last 7 days   Lab Units 03/16/24  1050   SODIUM mmol/L 139   POTASSIUM mmol/L 3.7   CHLORIDE mmol/L 103   CO2 mmol/L 17.0*   BUN mg/dL 17   CREATININE mg/dL 0.90        Current Medications   Scheduled Meds:albuterol sulfate HFA, 2 puff, Inhalation, 4x Daily - RT  budesonide-formoterol, 2 puff, Inhalation, BID - RT  cefTRIAXone, 2,000 mg, Intravenous, Q24H  famotidine, 40 mg, Oral, Daily  Nirmatrelvir & Ritonavir (300mg/100mg), 3 tablet, Oral, BID  sodium chloride, 10 mL, Intravenous, Q12H         Continuous Infusions:      Bhaskar Washington MD  [facility]   03/16/24   15:02 EDT

## 2024-03-16 NOTE — Clinical Note
Level of Care: Telemetry [5]   Admitting Physician: ZULLY BOUDREAUX [[id number]]   Attending Physician: ZULLY BOUDREAUX [[id number]]   Bed Request Comments: letitia

## 2024-03-17 LAB
ALBUMIN SERPL-MCNC: 3.9 G/DL (ref 3.5–5.2)
ALBUMIN/GLOB SERPL: 1.4 G/DL
ALP SERPL-CCNC: 99 U/L (ref 39–117)
ALT SERPL W P-5'-P-CCNC: 17 U/L (ref 1–33)
ANION GAP SERPL CALCULATED.3IONS-SCNC: 16 MMOL/L (ref 5–15)
AST SERPL-CCNC: 22 U/L (ref 1–32)
BACTERIA SPEC AEROBE CULT: NO GROWTH
BASOPHILS # BLD AUTO: 0.07 10*3/MM3 (ref 0–0.2)
BASOPHILS NFR BLD AUTO: 1 % (ref 0–1.5)
BILIRUB SERPL-MCNC: 0.6 MG/DL (ref 0–1.2)
BUN SERPL-MCNC: 13 MG/DL (ref 8–23)
BUN/CREAT SERPL: 13.3 (ref 7–25)
CALCIUM SPEC-SCNC: 9.1 MG/DL (ref 8.6–10.5)
CHLORIDE SERPL-SCNC: 104 MMOL/L (ref 98–107)
CO2 SERPL-SCNC: 20 MMOL/L (ref 22–29)
CREAT SERPL-MCNC: 0.98 MG/DL (ref 0.57–1)
DEPRECATED RDW RBC AUTO: 49 FL (ref 37–54)
EGFRCR SERPLBLD CKD-EPI 2021: 63.4 ML/MIN/1.73
EOSINOPHIL # BLD AUTO: 0.11 10*3/MM3 (ref 0–0.4)
EOSINOPHIL NFR BLD AUTO: 1.6 % (ref 0.3–6.2)
ERYTHROCYTE [DISTWIDTH] IN BLOOD BY AUTOMATED COUNT: 16.4 % (ref 12.3–15.4)
GLOBULIN UR ELPH-MCNC: 2.8 GM/DL
GLUCOSE SERPL-MCNC: 95 MG/DL (ref 65–99)
HCT VFR BLD AUTO: 40.6 % (ref 34–46.6)
HGB BLD-MCNC: 12.3 G/DL (ref 12–15.9)
IMM GRANULOCYTES # BLD AUTO: 0.02 10*3/MM3 (ref 0–0.05)
IMM GRANULOCYTES NFR BLD AUTO: 0.3 % (ref 0–0.5)
LYMPHOCYTES # BLD AUTO: 2.21 10*3/MM3 (ref 0.7–3.1)
LYMPHOCYTES NFR BLD AUTO: 32.3 % (ref 19.6–45.3)
MAGNESIUM SERPL-MCNC: 1.8 MG/DL (ref 1.6–2.4)
MCH RBC QN AUTO: 25.2 PG (ref 26.6–33)
MCHC RBC AUTO-ENTMCNC: 30.3 G/DL (ref 31.5–35.7)
MCV RBC AUTO: 83.2 FL (ref 79–97)
MONOCYTES # BLD AUTO: 0.52 10*3/MM3 (ref 0.1–0.9)
MONOCYTES NFR BLD AUTO: 7.6 % (ref 5–12)
NEUTROPHILS NFR BLD AUTO: 3.92 10*3/MM3 (ref 1.7–7)
NEUTROPHILS NFR BLD AUTO: 57.2 % (ref 42.7–76)
NRBC BLD AUTO-RTO: 0 /100 WBC (ref 0–0.2)
PHOSPHATE SERPL-MCNC: 4.1 MG/DL (ref 2.5–4.5)
PLATELET # BLD AUTO: 377 10*3/MM3 (ref 140–450)
PMV BLD AUTO: 9.3 FL (ref 6–12)
POTASSIUM SERPL-SCNC: 3.2 MMOL/L (ref 3.5–5.2)
POTASSIUM SERPL-SCNC: 3.5 MMOL/L (ref 3.5–5.2)
PROT SERPL-MCNC: 6.7 G/DL (ref 6–8.5)
QT INTERVAL: 381 MS
QTC INTERVAL: 481 MS
RBC # BLD AUTO: 4.88 10*6/MM3 (ref 3.77–5.28)
SODIUM SERPL-SCNC: 140 MMOL/L (ref 136–145)
WBC NRBC COR # BLD AUTO: 6.85 10*3/MM3 (ref 3.4–10.8)

## 2024-03-17 PROCEDURE — 36415 COLL VENOUS BLD VENIPUNCTURE: CPT | Performed by: INTERNAL MEDICINE

## 2024-03-17 PROCEDURE — 84132 ASSAY OF SERUM POTASSIUM: CPT | Performed by: INTERNAL MEDICINE

## 2024-03-17 PROCEDURE — 97166 OT EVAL MOD COMPLEX 45 MIN: CPT | Performed by: OCCUPATIONAL THERAPIST

## 2024-03-17 PROCEDURE — 97162 PT EVAL MOD COMPLEX 30 MIN: CPT

## 2024-03-17 PROCEDURE — 80053 COMPREHEN METABOLIC PANEL: CPT | Performed by: INTERNAL MEDICINE

## 2024-03-17 PROCEDURE — 25010000002 CEFTRIAXONE PER 250 MG: Performed by: INTERNAL MEDICINE

## 2024-03-17 PROCEDURE — 84100 ASSAY OF PHOSPHORUS: CPT | Performed by: INTERNAL MEDICINE

## 2024-03-17 PROCEDURE — 25010000002 HYDROMORPHONE 1 MG/ML SOLUTION: Performed by: INTERNAL MEDICINE

## 2024-03-17 PROCEDURE — 83735 ASSAY OF MAGNESIUM: CPT | Performed by: INTERNAL MEDICINE

## 2024-03-17 PROCEDURE — 63710000001 PROMETHAZINE PER 12.5 MG: Performed by: INTERNAL MEDICINE

## 2024-03-17 PROCEDURE — 85025 COMPLETE CBC W/AUTO DIFF WBC: CPT | Performed by: INTERNAL MEDICINE

## 2024-03-17 RX ORDER — POTASSIUM CHLORIDE 20 MEQ/1
40 TABLET, EXTENDED RELEASE ORAL EVERY 4 HOURS
Status: DISPENSED | OUTPATIENT
Start: 2024-03-17 | End: 2024-03-18

## 2024-03-17 RX ORDER — POTASSIUM CHLORIDE 20 MEQ/1
40 TABLET, EXTENDED RELEASE ORAL EVERY 4 HOURS
Status: COMPLETED | OUTPATIENT
Start: 2024-03-17 | End: 2024-03-17

## 2024-03-17 RX ADMIN — Medication 5 MG: at 20:49

## 2024-03-17 RX ADMIN — BUPROPION HYDROCHLORIDE 450 MG: 150 TABLET, EXTENDED RELEASE ORAL at 09:47

## 2024-03-17 RX ADMIN — POTASSIUM CHLORIDE 40 MEQ: 1500 TABLET, EXTENDED RELEASE ORAL at 13:17

## 2024-03-17 RX ADMIN — MILNACIPRAN HYDROCHLORIDE 100 MG: 25 TABLET, FILM COATED ORAL at 09:47

## 2024-03-17 RX ADMIN — HYDROMORPHONE HYDROCHLORIDE 0.5 MG: 1 INJECTION, SOLUTION INTRAMUSCULAR; INTRAVENOUS; SUBCUTANEOUS at 09:46

## 2024-03-17 RX ADMIN — NIRMATRELVIR AND RITONAVIR 3 TABLET: KIT at 09:47

## 2024-03-17 RX ADMIN — Medication 10 ML: at 09:48

## 2024-03-17 RX ADMIN — LIDOCAINE 1 PATCH: 4 PATCH TOPICAL at 09:46

## 2024-03-17 RX ADMIN — POTASSIUM CHLORIDE 40 MEQ: 1500 TABLET, EXTENDED RELEASE ORAL at 09:48

## 2024-03-17 RX ADMIN — PANTOPRAZOLE SODIUM 40 MG: 40 TABLET, DELAYED RELEASE ORAL at 16:40

## 2024-03-17 RX ADMIN — MILNACIPRAN HYDROCHLORIDE 100 MG: 25 TABLET, FILM COATED ORAL at 20:49

## 2024-03-17 RX ADMIN — PROMETHAZINE HYDROCHLORIDE 12.5 MG: 12.5 TABLET ORAL at 09:46

## 2024-03-17 RX ADMIN — FUROSEMIDE 20 MG: 40 TABLET ORAL at 09:47

## 2024-03-17 RX ADMIN — CEFTRIAXONE 2000 MG: 2 INJECTION, POWDER, FOR SOLUTION INTRAMUSCULAR; INTRAVENOUS at 16:40

## 2024-03-17 RX ADMIN — PROMETHAZINE HYDROCHLORIDE 12.5 MG: 12.5 TABLET ORAL at 20:53

## 2024-03-17 RX ADMIN — PANTOPRAZOLE SODIUM 40 MG: 40 TABLET, DELAYED RELEASE ORAL at 09:48

## 2024-03-17 RX ADMIN — Medication 10 ML: at 20:53

## 2024-03-17 NOTE — PLAN OF CARE
Problem: Adult Inpatient Plan of Care  Goal: Plan of Care Review  Outcome: Ongoing, Progressing  Flowsheets (Taken 3/17/2024 0540)  Progress: no change  Plan of Care Reviewed With: patient  Goal: Patient-Specific Goal (Individualized)  Outcome: Ongoing, Progressing  Goal: Absence of Hospital-Acquired Illness or Injury  Outcome: Ongoing, Progressing  Intervention: Identify and Manage Fall Risk  Recent Flowsheet Documentation  Taken 3/17/2024 0400 by Althea Morgan RN  Safety Promotion/Fall Prevention:   safety round/check completed   room organization consistent   fall prevention program maintained   clutter free environment maintained   assistive device/personal items within reach  Taken 3/17/2024 0000 by Althea Morgan RN  Safety Promotion/Fall Prevention:   safety round/check completed   room organization consistent   fall prevention program maintained   clutter free environment maintained   assistive device/personal items within reach  Taken 3/16/2024 2200 by Althea Morgan RN  Safety Promotion/Fall Prevention:   room organization consistent   safety round/check completed   fall prevention program maintained   clutter free environment maintained   assistive device/personal items within reach  Taken 3/16/2024 2000 by Althea Morgan RN  Safety Promotion/Fall Prevention:   safety round/check completed   room organization consistent   fall prevention program maintained   clutter free environment maintained   assistive device/personal items within reach  Intervention: Prevent Skin Injury  Recent Flowsheet Documentation  Taken 3/17/2024 0400 by Althea Morgan RN  Body Position: position changed independently  Taken 3/17/2024 0000 by Althea Morgan RN  Body Position: position changed independently  Taken 3/16/2024 2200 by Althea Morgan RN  Body Position: position changed independently  Taken 3/16/2024 2000 by Althea Morgan RN  Body Position: position changed  independently  Intervention: Prevent and Manage VTE (Venous Thromboembolism) Risk  Recent Flowsheet Documentation  Taken 3/16/2024 2000 by Althea Morgan RN  Activity Management: ambulated to bathroom  VTE Prevention/Management:   bilateral   sequential compression devices on  Intervention: Prevent Infection  Recent Flowsheet Documentation  Taken 3/17/2024 0400 by Althea Morgan RN  Infection Prevention:   single patient room provided   rest/sleep promoted   personal protective equipment utilized   hand hygiene promoted   equipment surfaces disinfected   environmental surveillance performed  Taken 3/17/2024 0200 by Althea Morgan RN  Infection Prevention:   single patient room provided   rest/sleep promoted   personal protective equipment utilized   hand hygiene promoted   equipment surfaces disinfected   environmental surveillance performed  Taken 3/17/2024 0000 by Althea Morgan RN  Infection Prevention:   single patient room provided   rest/sleep promoted   personal protective equipment utilized   hand hygiene promoted   equipment surfaces disinfected   environmental surveillance performed  Taken 3/16/2024 2200 by Althea Morgan RN  Infection Prevention:   single patient room provided   rest/sleep promoted   personal protective equipment utilized   hand hygiene promoted   equipment surfaces disinfected   environmental surveillance performed  Taken 3/16/2024 2000 by Althea Morgan RN  Infection Prevention:   single patient room provided   rest/sleep promoted   personal protective equipment utilized   hand hygiene promoted   equipment surfaces disinfected   environmental surveillance performed  Goal: Optimal Comfort and Wellbeing  Outcome: Ongoing, Progressing  Intervention: Provide Person-Centered Care  Recent Flowsheet Documentation  Taken 3/17/2024 0400 by Althea Morgan RN  Trust Relationship/Rapport: care explained  Taken 3/17/2024 0000 by Althea Morgan RN  Trust  Relationship/Rapport:   care explained   choices provided   emotional support provided  Taken 3/16/2024 2000 by Althea Morgan RN  Trust Relationship/Rapport:   care explained   choices provided  Goal: Readiness for Transition of Care  Outcome: Ongoing, Progressing     Problem: Pain Acute  Goal: Acceptable Pain Control and Functional Ability  Outcome: Ongoing, Progressing  Intervention: Prevent or Manage Pain  Recent Flowsheet Documentation  Taken 3/17/2024 0400 by Althea Morgan RN  Medication Review/Management:   medications reviewed   high-risk medications identified  Taken 3/17/2024 0000 by Althea Morgan RN  Sleep/Rest Enhancement:   awakenings minimized   noise level reduced  Medication Review/Management:   medications reviewed   high-risk medications identified  Taken 3/16/2024 2200 by Althea Morgan RN  Medication Review/Management:   medications reviewed   high-risk medications identified  Taken 3/16/2024 2000 by Althea Morgan RN  Sleep/Rest Enhancement:   awakenings minimized   noise level reduced  Medication Review/Management:   medications reviewed   high-risk medications identified  Intervention: Optimize Psychosocial Wellbeing  Recent Flowsheet Documentation  Taken 3/17/2024 0400 by Althea Morgan RN  Supportive Measures: active listening utilized  Taken 3/16/2024 2000 by Althea Morgan RN  Supportive Measures: active listening utilized  Diversional Activities: television     Problem: Infection  Goal: Absence of Infection Signs and Symptoms  Outcome: Ongoing, Progressing  Intervention: Prevent or Manage Infection  Recent Flowsheet Documentation  Taken 3/17/2024 0400 by Althea Morgan RN  Isolation Precautions: precautions maintained  Taken 3/17/2024 0200 by Althea Morgan RN  Isolation Precautions: precautions maintained  Taken 3/17/2024 0000 by Althea Morgan RN  Isolation Precautions: precautions maintained  Taken 3/16/2024 2200 by Cathy  ROCKY Oh  Isolation Precautions: precautions maintained  Taken 3/16/2024 2000 by Althea Morgan RN  Isolation Precautions: precautions maintained     Problem: Adjustment to Illness (Sepsis/Septic Shock)  Goal: Optimal Coping  Outcome: Ongoing, Progressing  Intervention: Optimize Psychosocial Adjustment to Illness  Recent Flowsheet Documentation  Taken 3/17/2024 0400 by Althea Morgan RN  Supportive Measures: active listening utilized  Family/Support System Care: support provided  Taken 3/17/2024 0000 by Althea Morgan RN  Family/Support System Care: support provided  Taken 3/16/2024 2000 by Althea Morgan RN  Supportive Measures: active listening utilized  Family/Support System Care: support provided     Problem: Bleeding (Sepsis/Septic Shock)  Goal: Absence of Bleeding  Outcome: Ongoing, Progressing     Problem: Glycemic Control Impaired (Sepsis/Septic Shock)  Goal: Blood Glucose Level Within Desired Range  Outcome: Ongoing, Progressing     Problem: Infection Progression (Sepsis/Septic Shock)  Goal: Absence of Infection Signs and Symptoms  Outcome: Ongoing, Progressing  Intervention: Initiate Sepsis Management  Recent Flowsheet Documentation  Taken 3/17/2024 0400 by Althea Morgan RN  Infection Prevention:   single patient room provided   rest/sleep promoted   personal protective equipment utilized   hand hygiene promoted   equipment surfaces disinfected   environmental surveillance performed  Isolation Precautions: precautions maintained  Taken 3/17/2024 0200 by Althea Morgan RN  Infection Prevention:   single patient room provided   rest/sleep promoted   personal protective equipment utilized   hand hygiene promoted   equipment surfaces disinfected   environmental surveillance performed  Isolation Precautions: precautions maintained  Taken 3/17/2024 0000 by Althea Morgan RN  Infection Prevention:   single patient room provided   rest/sleep promoted   personal protective  equipment utilized   hand hygiene promoted   equipment surfaces disinfected   environmental surveillance performed  Isolation Precautions: precautions maintained  Taken 3/16/2024 2200 by Althea Morgan RN  Infection Prevention:   single patient room provided   rest/sleep promoted   personal protective equipment utilized   hand hygiene promoted   equipment surfaces disinfected   environmental surveillance performed  Isolation Precautions: precautions maintained  Taken 3/16/2024 2000 by Althea Morgan RN  Infection Prevention:   single patient room provided   rest/sleep promoted   personal protective equipment utilized   hand hygiene promoted   equipment surfaces disinfected   environmental surveillance performed  Isolation Precautions: precautions maintained  Intervention: Promote Recovery  Recent Flowsheet Documentation  Taken 3/17/2024 0000 by Althea Morgan RN  Sleep/Rest Enhancement:   awakenings minimized   noise level reduced  Taken 3/16/2024 2000 by Althea Morgan RN  Activity Management: ambulated to bathroom  Sleep/Rest Enhancement:   awakenings minimized   noise level reduced     Problem: Nutrition Impaired (Sepsis/Septic Shock)  Goal: Optimal Nutrition Intake  Outcome: Ongoing, Progressing     Problem: Osteoarthritis Comorbidity  Goal: Maintenance of Osteoarthritis Symptom Control  Outcome: Ongoing, Progressing  Intervention: Maintain Osteoarthritis Symptom Control  Recent Flowsheet Documentation  Taken 3/17/2024 0400 by Althea Morgan RN  Medication Review/Management:   medications reviewed   high-risk medications identified  Taken 3/17/2024 0000 by Althea Morgan RN  Medication Review/Management:   medications reviewed   high-risk medications identified  Taken 3/16/2024 2200 by Althea Morgan RN  Medication Review/Management:   medications reviewed   high-risk medications identified  Taken 3/16/2024 2000 by Althea Morgan RN  Activity Management: ambulated to  bathroom  Medication Review/Management:   medications reviewed   high-risk medications identified     Problem: Pain Chronic (Persistent) (Comorbidity Management)  Goal: Acceptable Pain Control and Functional Ability  Outcome: Ongoing, Progressing  Intervention: Manage Persistent Pain  Recent Flowsheet Documentation  Taken 3/17/2024 0400 by Althea Morgna RN  Medication Review/Management:   medications reviewed   high-risk medications identified  Taken 3/17/2024 0000 by Althea Morgan RN  Sleep/Rest Enhancement:   awakenings minimized   noise level reduced  Medication Review/Management:   medications reviewed   high-risk medications identified  Taken 3/16/2024 2200 by Althea Morgan RN  Medication Review/Management:   medications reviewed   high-risk medications identified  Taken 3/16/2024 2000 by Althea Morgan RN  Sleep/Rest Enhancement:   awakenings minimized   noise level reduced  Medication Review/Management:   medications reviewed   high-risk medications identified  Intervention: Optimize Psychosocial Wellbeing  Recent Flowsheet Documentation  Taken 3/17/2024 0400 by Althea Morgan RN  Supportive Measures: active listening utilized  Family/Support System Care: support provided  Taken 3/17/2024 0000 by Althea Morgan RN  Family/Support System Care: support provided  Taken 3/16/2024 2000 by Althea Morgan RN  Supportive Measures: active listening utilized  Diversional Activities: television  Family/Support System Care: support provided   Goal Outcome Evaluation:  Plan of Care Reviewed With: patient        Progress: no change

## 2024-03-17 NOTE — PROGRESS NOTES
Expand All Collapse All[]Expand All by Default    History and Physical   Omaira Roque : 1955 MRN:[id number] LOS:0      Reason for admission: AMS (altered mental status)      Assessment / Plan      #Altered mental status secondary to infection related encephalopathy from UTI/community-acquired pneumonia/COVID infection  -Patient presented with altered mental status for 5 days prior to admission.  She is unable to answer the question appropriately.  In the ED patient does not know where she is and why she is in the hospital and wants to go home.  -Hemodynamically stable in the ED labs showing COVID-positive UTI positive.  Chest x-ray with bilateral pneumonia.  No significant leukocytosis.  Non anion gap metabolic acidosis present.  Lactic acid is normal.  -CT head without any acute changes.  -Patient was given aztreonam and vancomycin in the ED given patient allergic reaction to penicillin and Cefdinir.   -Given low cross-reactivity of Rocephin with penicillin and cefdinir, IV Rocephin is ordered to cover both UTI and pneumonia.  Pharmacy teams is aware of the plan.  Benadryl is ordered just in case.  We can try 1 dose of Rocephin if it is she cannot tolerate can change to another appropriate antibiotic at the time.  -For COVID infection patient does not any oxygen requirement in the ED and started on baricitinib. No steroid needed for now.  Covid precaution to continue.  -Follow-up inflammatory markers.  Urine culture and blood culture to follow-up.  -Respiratory treatment started.  -PT OT CM     3/17  Patient admitted yesterday with encephalopathy  Mental status as per been stable  Patient with score COVID but not symptomatic  Can be downgraded to medical bed  Anticipate discharge home tomorrow     #Chronic back pain/neck pain  -Pain management to continue.  Patient has allergic reaction to morphine and pregabalin and amitriptyline.      #Hyperlipidemia  -Resume home medication once verified by pharmacy      Nutrition: Diet: Regular/House; Fluid Consistency: Thin (IDDSI 0)      DVT Prophylaxis:   Mechanical Order History:           Ordered         03/16/24 1433   Place Sequential Compression Device  Once             03/16/24 1433   Maintain Sequential Compression Device  Continuous                               Pharmalogical Order History:         None                History of Present illness      A 68 y.o. old female patient with PMH of hypothyroidism B12 deficiency hyperlipidemia neck pain and lower back pain migraine presents to the hospital with complaints of altered mental status for 5 days prior to admission.  Patient is recently follow-up with primary care physician on last Monday get the blood work done she was following.  Since then patient started having lethargy altered mental status and able to answer appropriately to the question.  In the ED patient is hemodynamically stable.  Found to be patient has UTI and COVID infection.  Chest x-ray with bilateral pneumonia.  Patient is given vancomycin and aztreonam in the ED given patient has multiple allergy.  Patient has an anion gap metabolic acidosis.  No significant leukocytosis.  Normal lactic acid.  TSH of 5.3.  No troponin leakage.       Patient will be admitted for encephalopathy secondary to COVID/UTI/bilateral pneumonia.  Will be started on IV Rocephin as Rocephin has low cross-reactivity with Cefdinir and penicillin.  Pharmacy team is aware of the plan.  We can try 1 dose and if it is she cannot tolerate can switch to other antibiotic.        Subjective / Review of systems      Review of Systems   Feeling ok   AAO 2   No CP abdo pain      Past Medical/Surgical/Social/Family History & Allergies      Medical History        Past Medical History:   Diagnosis Date    Back pain      Hip pain      Neck pain           Surgical History         Past Surgical History:   Procedure Laterality Date    GASTRIC SLEEVE LAPAROSCOPIC   05/29/2019    HYSTERECTOMY         REDUCTION MAMMAPLASTY Bilateral 2005         Social History   Social History           Socioeconomic History    Marital status:    Tobacco Use    Smoking status: Never    Smokeless tobacco: Never   Substance and Sexual Activity    Alcohol use: No    Drug use: Never    Sexual activity: Defer               Family History   Problem Relation Age of Onset    Dementia Mother      Heart attack Father        Allergies        Allergies   Allergen Reactions    Ace Inhibitors Unknown (See Comments) and Other (See Comments)    Amitriptyline Hcl Unknown (See Comments) and Other (See Comments)    Amoxicillin Anaphylaxis    Carbamazepine Unknown (See Comments) and Other (See Comments)    Cefdinir Shortness Of Breath    Metronidazole Nausea Only    Morphine Palpitations, Other (See Comments) and Unknown (See Comments)    Penicillins Anaphylaxis    Pregabalin Other (See Comments)    Synthroid  [Levothyroxine Sodium] Rash            Home Medications              Prior to Admission medications    Medication Sig Start Date End Date Taking? Authorizing Provider   buPROPion XL (FORFIVO XL) 450 MG 24 hr tablet Take 1 tablet by mouth Daily.     Yes Gil Meza MD   pregabalin (LYRICA) 300 MG capsule Take 1 capsule by mouth 2 (Two) Times a Day.     Yes Gil Meza MD   atorvastatin (LIPITOR) 40 MG tablet Take 1 tablet by mouth Daily. 6/8/18     Gil Meza MD   BRECATHERINE ELLIPTA 200-25 MCG/INH inhaler Inhale 1 puff Daily. 5/31/19     Gil Meza MD   cyanocobalamin 1000 MCG/ML injection Inject 1 mL into the appropriate muscle as directed by prescriber Every 7 (Seven) Days.       Gil Meza MD   DEXILANT 60 MG capsule TAKE 1 CAPSULE BY MOUTH EVERY DAY 9/26/19     Jannie Holland MD   dicyclomine (BENTYL) 10 MG capsule Take 1 capsule by mouth 4 (Four) Times a Day Before Meals & at Bedtime.       Gil Meza MD   furosemide (LASIX) 20 MG tablet TAKE 1 TABLET BY MOUTH EVERY DAY  7/1/19     Jannie Holland MD   galcanezumab-gnlm (EMGALITY) 120 MG/ML auto-injector pen Inject 1 mL under the skin into the appropriate area as directed 1 (One) Time.       Gil Meza MD   potassium chloride (KLOR-CON M20) 20 MEQ CR tablet Take 1 tablet by mouth 2 (Two) Times a Day.       Gil Meza MD   promethazine (PHENERGAN) 25 MG tablet Every 6 (Six) Hours. 9/11/18     Gil Meza MD   propranolol LA (INDERAL LA) 120 MG 24 hr capsule Take 1 capsule by mouth Daily. 5/15/19     Gil Meza MD   Savella 100 MG tablet Take 1 tablet by mouth Every 12 (Twelve) Hours. 10/9/23     Gil Meza MD   Thyroid (ARMOUR THYROID) 90 MG PO tablet Take 1 tablet by mouth. 3/22/19     Gil Meza MD   ubrogepant (Ubrelvy) 100 MG tablet See Admin Instructions. TAKE 1 TABLET BY MOUTH AS NEEDED FOR MIGRAINE IF NEEDED THEN TAKE 2ND DOSE AT LEAST 2 HOURS AFTER FIRST DOSE *MAX 2 TABS/24 HOURS*       Gil Meza MD   verapamil PM (VERELAN PM) 180 MG 24 hr capsule TAKE 1 CAPSULE BY MOUTH EVERY DAY 8/2/19     Jannie Holland MD   buPROPion XL (WELLBUTRIN XL) 300 MG 24 hr tablet Take 1 tablet by mouth Daily. 10/13/23 3/16/24   Gil Meza MD   cyanocobalamin 1000 MCG/ML injection INJECT 1 ML INTRAMUSCULARLY ONCE A WEEK 4/22/19 3/16/24   Gil Meza MD   dicyclomine (BENTYL) 10 MG capsule TAKE ONE CAPSULE BY MOUTH FOUR TIMES DAILY AS NEEDED 10/19/23 3/16/24   Gil Meza MD   famotidine (PEPCID) 40 MG tablet     3/16/24   Gil Meza MD   methocarbamol (ROBAXIN) 750 MG tablet   11/1/23 3/16/24   Gil Meza MD   PARoxetine (PAXIL) 40 MG tablet TAKE 1 TABLET BY MOUTH EVERY DAY 8/2/19 3/16/24   Jannie Holland MD   pregabalin (LYRICA) 300 MG capsule   10/9/23 3/16/24   Gil Meza MD   raNITIdine (ZANTAC) 150 MG capsule Take 1 capsule by mouth.   3/16/24   Provider, MD Gil   Restasis 0.05 %  "ophthalmic emulsion instill 1 drop by ophthalmic route 2 times every day into both eyes 12/12/23 3/16/24   ProviderGil MD   Syringe/Needle, Disp, (BD ECLIPSE SYRINGE) 25G X 1\" 3 ML misc BD ECLIPSE SYRINGE 25G X 1\" 3 ML 6/6/16 3/16/24   Gil Meza MD   tiZANidine (ZANAFLEX) 4 MG tablet     3/16/24   Gil Meza MD   Ubrelvy 100 MG tablet TAKE 1 TABLET BY MOUTH AS NEEDED FOR MIGRAINE IF NEEDED THEN TAKE 2ND DOSE AT LEAST 2 HOURS AFTER FIRST DOSE *MAX 2 TABS/24 HOURS* 10/13/23 3/16/24   Gil Meza MD      Objective / Physical Exam   Vital signs:  Temp: 98.4 °F (36.9 °C)  BP: 130/79  Heart Rate: 86  Resp: 24  SpO2: 98 %  Weight: 84.8 kg (187 lb)     Admission Weight: Weight: 84.8 kg (187 lb)     Physical Exam   Physical Exam  HENT:      Head: Normocephalic and atraumatic.      Nose: Nose normal.   Eyes:      Extraocular Movements: Extraocular movements intact.      Conjunctivae/sclera: Conjunctivae normal.      Pupils: Pupils are equal, round, and reactive to light.   Cardiovascular:      Rate and Rhythm: normal       Pulses: Normal pulses.      Heart sounds: Normal heart sounds.   Pulmonary:      Effort: normal      Breath sounds: normal   Abdominal:      General: Abdomen is flat. Bowel sounds are normal.      Palpations: Abdomen is soft.   Musculoskeletal:         General: Normal range of motion.      Cervical back: Normal range of motion and neck supple.   Skin:     General: Skin is dry.   Neurological:      General: No focal deficit present.      Mental Status: alert. AAO 2   Psychiatric:         Mood and Affect: Mood normal.         Labs           Results from last 7 days   Lab Units 03/16/24  1050   WBC 10*3/mm3 7.12   HEMATOCRIT % 45.6   PLATELETS 10*3/mm3 464*           Results from last 7 days   Lab Units 03/16/24  1050   SODIUM mmol/L 139   POTASSIUM mmol/L 3.7   CHLORIDE mmol/L 103   CO2 mmol/L 17.0*   BUN mg/dL 17   CREATININE mg/dL 0.90         Current Medications "   Scheduled Meds:  Scheduled Medication   albuterol sulfate HFA, 2 puff, Inhalation, 4x Daily - RT  budesonide-formoterol, 2 puff, Inhalation, BID - RT  cefTRIAXone, 2,000 mg, Intravenous, Q24H  famotidine, 40 mg, Oral, Daily  Nirmatrelvir & Ritonavir (300mg/100mg), 3 tablet, Oral, BID  sodium chloride, 10 mL, Intravenous, Q12H            Continuous Infusions:  Infusion Medications

## 2024-03-17 NOTE — PLAN OF CARE
Goal Outcome Evaluation:  Plan of Care Reviewed With: patient           Outcome Evaluation: Pt is a 68 y.o. F adm with AMS, vomiting & diarrhea. Dx with PNA, COVID-19, UTI.   PMH: chronic back/neck pain, hyperlipidemia. At baseline pt lives with lives with spouse, daughter & grand-daughter In ranch style home with basement. She does normally access basement as that is where the laundry is located. She also reports a recent fall on those stairs with reported hip pain. She is normally ind at all levels including driving. She states her family can assist PRN at home. Upon eval, pt is A&O X4. She required additional response time to answer  & some additional questions, but was able to follow all 1-2 step directives & provide PLOF. She completed all ADL transfers & LB dressing tasks independently with demo of  good balance, strength & posture. Nursing & pt report she has been amb to bathroom independently. At this time, pt seems to be functioning near her baseline status with mild dec endurance. She states she still is having trouble with s/s of diarrhea. Discussed safety prec in hospital setting & encouraged her to call for assistance PRN. Anticipate pt will be safe to return home with family once medically cleared. No further OT services are recommended at this time. OT will sign off at this time.      Anticipated Discharge Disposition (OT): home with assist

## 2024-03-17 NOTE — THERAPY EVALUATION
Patient Name: Omaira Roque  : 1955    MRN: [id number]                              Today's Date: 3/17/2024       Admit Date: 3/16/2024    Visit Dx:     ICD-10-CM ICD-9-CM   1. Acute UTI  N39.0 599.0   2. COVID-19  U07.1 079.89   3. Pneumonia of both lower lobes due to infectious organism  J18.9 486   4. Altered mental status, unspecified altered mental status type  R41.82 780.97     Patient Active Problem List   Diagnosis    Anaclitic depression    Anemia, B12 deficiency    Anxiety state    Benign neoplasm of pineal gland    Carpal tunnel syndrome    Cervical stenosis of spinal canal    Degeneration of intervertebral disc    Dependent edema    Dermatitis    Diabetes mellitus    Drug-induced constipation    Encounter for long-term (current) use of other medications    Encounter for general adult medical examination without abnormal findings    Fatigue    Female climacteric state    Hay fever    Hives    AMS (altered mental status)     Past Medical History:   Diagnosis Date    Back pain     Hip pain     Neck pain      Past Surgical History:   Procedure Laterality Date    GASTRIC SLEEVE LAPAROSCOPIC  2019    HYSTERECTOMY      REDUCTION MAMMAPLASTY Bilateral       General Information       Row Name 24 1108          Physical Therapy Time and Intention    Document Type evaluation  -EL     Mode of Treatment individual therapy;physical therapy  -EL       Row Name 24 1108          General Information    Prior Level of Function independent:;all household mobility;ADL's  -EL       Row Name 24 1108          Living Environment    People in Home spouse;child(heidi), adult  -EL       Row Name 24 1108          Home Main Entrance    Number of Stairs, Main Entrance none  -EL       Row Name 24 1108          Stairs Within Home, Primary    Number of Stairs, Within Home, Primary twelve  -EL     Stair Railings, Within Home, Primary railings safe and in good condition  -EL     Stairs Comment,  Within Home, Primary to basement for laundry  -EL       Row Name 03/17/24 1108          Cognition    Orientation Status (Cognition) oriented x 4  -EL       Row Name 03/17/24 1108          Safety Issues, Functional Mobility    Impairments Affecting Function (Mobility) cognition  -EL     Cognitive Impairments, Mobility Safety/Performance insight into deficits/self-awareness  -EL               User Key  (r) = Recorded By, (t) = Taken By, (c) = Cosigned By      Initials Name Provider Type    Jose Guadalupe Jefferson, PT Physical Therapist                   Mobility       Row Name 03/17/24 1120          Bed Mobility    Bed Mobility bed mobility (all) activities  -EL     All Activities, [place] (Bed Mobility) modified independence  -EL     Assistive Device (Bed Mobility) bed rails  -EL       Row Name 03/17/24 1120          Bed-Chair Transfer    Bed-Chair [place] (Transfers) contact guard  -EL       Row Name 03/17/24 1120          Sit-Stand Transfer    Sit-Stand [place] (Transfers) contact guard  -EL       Row Name 03/17/24 1120          Gait/Stairs (Locomotion)    [place] Level (Gait) contact guard  -EL     Distance in Feet (Gait) 50  -EL               User Key  (r) = Recorded By, (t) = Taken By, (c) = Cosigned By      Initials Name Provider Type    Jose Guadalupe Jefferson, PT Physical Therapist                   Obj/Interventions       Row Name 03/17/24 1134          Range of Motion Comprehensive    General Range of Motion bilateral lower extremity ROM WFL  -EL       Row Name 03/17/24 1134          Strength Comprehensive (MMT)    General Manual Muscle Testing (MMT) Assessment lower extremity strength deficits identified  -       Row Name 03/17/24 1134          Balance    Balance Assessment sitting static balance;standing static balance;standing dynamic balance  -EL     Static Sitting Balance independent  -EL     Static Standing Balance contact guard  -EL     Dynamic Standing Balance contact guard  -EL       Row Name  03/17/24 1134          Sensory Assessment (Somatosensory)    Sensory Assessment (Somatosensory) sensation intact  -EL               User Key  (r) = Recorded By, (t) = Taken By, (c) = Cosigned By      Initials Name Provider Type    Jose Guadalupe Jefferson, PT Physical Therapist                   Goals/Plan    No documentation.                  Clinical Impression       Row Name 03/17/24 1145          Pain    Pretreatment Pain Rating 4/10  -EL     Posttreatment Pain Rating 4/10  -EL       Row Name 03/17/24 1145          Plan of Care Review    Plan of Care Reviewed With patient  -EL     Outcome Evaluation Pt is a 67 YO F admitted with UTI, confusion COVID 19 and PNA in lydia lower lungs. Pt states she lives at home with spouse, daughter and grandaughter and generally is independent with all ADLs, ambultaion without AD and one recent fall. Pt fell navigating steps to basement. Pt this date demonstrates mild confusion, although able to answer all PLOF and AO questions with increased time. Pt ambulated and transferred in room without physical assistance and appears near function abseline. Pt appears safe to return home with family and PT to sign off at this time. If status changes, new orders required.  -EL       Row Name 03/17/24 1145          Therapy Assessment/Plan (PT)    Criteria for Skilled Interventions Met (PT) no problems identified which require skilled intervention  -EL     Therapy Frequency (PT) evaluation only  -EL       Row Name 03/17/24 1145          Vital Signs    O2 Delivery Pre Treatment room air  -EL     O2 Delivery Intra Treatment room air  -EL     O2 Delivery Post Treatment room air  -EL     Pre Patient Position Supine  -EL     Intra Patient Position Standing  -EL     Post Patient Position Sitting  -EL       Row Name 03/17/24 1145          Positioning and Restraints    Pre-Treatment Position in bed  -EL     Post Treatment Position chair  -EL     In Chair with OT  -EL               User Key  (r) = Recorded By, (t)  = Taken By, (c) = Cosigned By      Initials Name Provider Type    EL Jose Guadalupe Luna, PT Physical Therapist                   Outcome Measures       Row Name 03/17/24 1200 03/17/24 0800       How much help from another person do you currently need...    Turning from your back to your side while in flat bed without using bedrails? 4  -EL 4  -SN    Moving from lying on back to sitting on the side of a flat bed without bedrails? 4  -EL 4  -SN    Moving to and from a bed to a chair (including a wheelchair)? 4  -EL 4  -SN    Standing up from a chair using your arms (e.g., wheelchair, bedside chair)? 4  -EL 4  -SN    Climbing 3-5 steps with a railing? 3  -EL 4  -SN    To walk in hospital room? 4  -EL 4  -SN    AM-PAC 6 Clicks Score (PT) 23  -EL 24  -SN    Highest Level of Mobility Goal 7 --> Walk 25 feet or more  -EL 8 --> Walked 250 feet or more  -SN      Row Name 03/17/24 1200          Functional Assessment    Outcome Measure Options AM-PAC 6 Clicks Basic Mobility (PT)  -EL               User Key  (r) = Recorded By, (t) = Taken By, (c) = Cosigned By      Initials Name Provider Type    EL Jose Guadalupe Luna PT Physical Therapist    Gloria Felipe LPN Licensed Nurse                                 Physical Therapy Education       Title: PT OT SLP Therapies (Done)       Topic: Physical Therapy (Done)       Point: Mobility training (Done)       Learning Progress Summary             Patient Acceptance, E,TB, VU by  at 3/17/2024 1200                         Point: Precautions (Done)       Learning Progress Summary             Patient Acceptance, E,TB, VU by  at 3/17/2024 1200                                         User Key       Initials Effective Dates Name Provider Type Discipline     06/23/20 -  Jose Guadalupe Luna, PT Physical Therapist PT                  PT Recommendation and Plan     Plan of Care Reviewed With: patient  Outcome Evaluation: Pt is a 69 YO F admitted with UTI, confusion COVID 19 and PNA in lydia lower lungs. Pt  states she lives at home with spouse, daughter and grandaughter and generally is independent with all ADLs, ambultaion without AD and one recent fall. Pt fell navigating steps to basement. Pt this date demonstrates mild confusion, although able to answer all PLOF and AO questions with increased time. Pt ambulated and transferred in room without physical assistance and appears near function abseline. Pt appears safe to return home with family and PT to sign off at this time. If status changes, new orders required.     Time Calculation:   PT Evaluation Complexity  History, PT Evaluation Complexity: 1-2 personal factors and/or comorbidities  Examination of Body Systems (PT Eval Complexity): total of 3 or more elements  Clinical Presentation (PT Evaluation Complexity): evolving  Clinical Decision Making (PT Evaluation Complexity): moderate complexity  Overall Complexity (PT Evaluation Complexity): moderate complexity     PT Charges       Row Name 03/17/24 1200             Time Calculation    Start Time 0954  -EL      Stop Time 1021  -EL      Time Calculation (min) 27 min  -EL      PT Received On 03/17/24  -EL                User Key  (r) = Recorded By, (t) = Taken By, (c) = Cosigned By      Initials Name Provider Type    Jose Guadalupe Jefferson PT Physical Therapist                  Therapy Charges for Today       Code Description Service Date Service Provider Modifiers Qty    [id number]  PT EVAL MOD COMPLEXITY 4 3/17/2024 Jose Guadalupe Luna, PT GP 1            PT G-Codes  Outcome Measure Options: AM-PAC 6 Clicks Basic Mobility (PT)  AM-PAC 6 Clicks Score (PT): 23  PT Discharge Summary  Anticipated Discharge Disposition (PT): home with assist    Jose Guadalupe Luna PT  3/17/2024

## 2024-03-17 NOTE — PLAN OF CARE
Goal Outcome Evaluation:  Plan of Care Reviewed With: patient           Outcome Evaluation: Pt is a 67 YO F admitted with UTI, confusion COVID 19 and PNA in lydia lower lungs. Pt states she lives at home with spouse, daughter and grandaughter and generally is independent with all ADLs, ambultaion without AD and one recent fall. Pt fell navigating steps to basement. Pt this date demonstrates mild confusion, although able to answer all PLOF and AO questions with increased time. Pt ambulated and transferred in room without physical assistance and appears near function abseline. Pt appears safe to return home with family and PT to sign off at this time. If status changes, new orders required.      Anticipated Discharge Disposition (PT): home with assist

## 2024-03-17 NOTE — PLAN OF CARE
Problem: Adult Inpatient Plan of Care  Goal: Plan of Care Review  Outcome: Ongoing, Progressing  Goal: Patient-Specific Goal (Individualized)  Outcome: Ongoing, Progressing  Goal: Absence of Hospital-Acquired Illness or Injury  Outcome: Ongoing, Progressing  Intervention: Identify and Manage Fall Risk  Recent Flowsheet Documentation  Taken 3/17/2024 1400 by Arcelia Lucas RN  Safety Promotion/Fall Prevention: safety round/check completed  Taken 3/17/2024 1300 by Arcelia Lucas RN  Safety Promotion/Fall Prevention: safety round/check completed  Taken 3/17/2024 1200 by Arcelia Lucas RN  Safety Promotion/Fall Prevention: safety round/check completed  Goal: Optimal Comfort and Wellbeing  Outcome: Ongoing, Progressing  Goal: Readiness for Transition of Care  Outcome: Ongoing, Progressing     Problem: Pain Acute  Goal: Acceptable Pain Control and Functional Ability  Outcome: Ongoing, Progressing     Problem: Infection  Goal: Absence of Infection Signs and Symptoms  Outcome: Ongoing, Progressing     Problem: Adjustment to Illness (Sepsis/Septic Shock)  Goal: Optimal Coping  Outcome: Ongoing, Progressing     Problem: Bleeding (Sepsis/Septic Shock)  Goal: Absence of Bleeding  Outcome: Ongoing, Progressing     Problem: Glycemic Control Impaired (Sepsis/Septic Shock)  Goal: Blood Glucose Level Within Desired Range  Outcome: Ongoing, Progressing     Problem: Infection Progression (Sepsis/Septic Shock)  Goal: Absence of Infection Signs and Symptoms  Outcome: Ongoing, Progressing     Problem: Nutrition Impaired (Sepsis/Septic Shock)  Goal: Optimal Nutrition Intake  Outcome: Ongoing, Progressing     Problem: Osteoarthritis Comorbidity  Goal: Maintenance of Osteoarthritis Symptom Control  Outcome: Ongoing, Progressing     Problem: Pain Chronic (Persistent) (Comorbidity Management)  Goal: Acceptable Pain Control and Functional Ability  Outcome: Ongoing, Progressing   Goal Outcome Evaluation:

## 2024-03-17 NOTE — THERAPY EVALUATION
Patient Name: Omaira Roque  : 1955    MRN: [id number]                              Today's Date: 3/17/2024       Admit Date: 3/16/2024    Visit Dx:     ICD-10-CM ICD-9-CM   1. Acute UTI  N39.0 599.0   2. COVID-19  U07.1 079.89   3. Pneumonia of both lower lobes due to infectious organism  J18.9 486   4. Altered mental status, unspecified altered mental status type  R41.82 780.97     Patient Active Problem List   Diagnosis    Anaclitic depression    Anemia, B12 deficiency    Anxiety state    Benign neoplasm of pineal gland    Carpal tunnel syndrome    Cervical stenosis of spinal canal    Degeneration of intervertebral disc    Dependent edema    Dermatitis    Diabetes mellitus    Drug-induced constipation    Encounter for long-term (current) use of other medications    Encounter for general adult medical examination without abnormal findings    Fatigue    Female climacteric state    Hay fever    Hives    AMS (altered mental status)     Past Medical History:   Diagnosis Date    Back pain     Hip pain     Neck pain      Past Surgical History:   Procedure Laterality Date    GASTRIC SLEEVE LAPAROSCOPIC  2019    HYSTERECTOMY      REDUCTION MAMMAPLASTY Bilateral       General Information       Row Name 24 1209          OT Time and Intention    Document Type evaluation  -DT     Mode of Treatment occupational therapy  -DT       Row Name 24 1209          General Information    Patient Profile Reviewed yes  -DT     Prior Level of Function independent:;all household mobility;ADL's;driving;home management;using stairs  -DT     Existing Precautions/Restrictions other (see comments)  Positive for Covid-19  -DT     Barriers to Rehab none identified  -DT       Row Name 24 1209          Living Environment    People in Home child(heidi), adult;grandchild(heidi);spouse  -DT       Row Name 24 1209          Home Main Entrance    Number of Stairs, Main Entrance none  -DT       Row Name 24 1205           Stairs Within Home, Primary    Number of Stairs, Within Home, Primary twelve  -DT     Stair Railings, Within Home, Primary railings safe and in good condition  -DT     Stairs Comment, Within Home, Primary to basement for laundry  -DT       Row Name 03/17/24 1209          Cognition    Orientation Status (Cognition) oriented x 4  -DT       Row Name 03/17/24 1211          Safety Issues, Functional Mobility    Impairments Affecting Function (Mobility) cognition  -DT     Cognitive Impairments, Mobility Safety/Performance other (see comments)  mild deficits with cognition requiring extra time to process responses to orientation at times, but able to provide PLOF & follow all directives.  -DT               User Key  (r) = Recorded By, (t) = Taken By, (c) = Cosigned By      Initials Name Provider Type    DT Pasacle Wilder, OT Occupational Therapist                     Mobility/ADL's       Row Name 03/17/24 1221          Bed Mobility    Bed Mobility bed mobility (all) activities  -DT     All Activities, [place] (Bed Mobility) modified independence  -DT     Assistive Device (Bed Mobility) bed rails  -DT       Row Name 03/17/24 1221          Transfers    Transfers sit-stand transfer;stand-sit transfer;bed-chair transfer  -DT       Row Name 03/17/24 1221          Bed-Chair Transfer    Bed-Chair [place] (Transfers) standby assist  -DT       Row Name 03/17/24 1221          Sit-Stand Transfer    Sit-Stand [place] (Transfers) standby assist  -DT       Row Name 03/17/24 1221          Stand-Sit Transfer    Stand-Sit [place] (Transfers) standby assist  -DT       Row Name 03/17/24 1221          Functional Mobility    Functional Mobility- Ind. Level supervision required  -DT     Functional Mobility-Distance (Feet) 30  -DT     Patient was able to Ambulate yes  -DT       Row Name 03/17/24 1221          Activities of Daily Living    BADL Assessment/Intervention lower body dressing  -DT       Row Name  03/17/24 1221          Lower Body Dressing Assessment/Training    [place] Level (Lower Body Dressing) lower body dressing skills;independent  -DT     Position (Lower Body Dressing) edge of bed sitting  -DT               User Key  (r) = Recorded By, (t) = Taken By, (c) = Cosigned By      Initials Name Provider Type    Pascale Pineda, OT Occupational Therapist                   Obj/Interventions       Row Name 03/17/24 1228          Range of Motion Comprehensive    General Range of Motion no range of motion deficits identified  -DT       Row Name 03/17/24 1228          Strength Comprehensive (MMT)    General Manual Muscle Testing (MMT) Assessment no strength deficits identified  -DT       Row Name 03/17/24 1228          Balance    Balance Assessment sitting static balance;sitting dynamic balance;standing static balance;standing dynamic balance  -DT     Static Sitting Balance independent  -DT     Dynamic Sitting Balance independent  -DT     Position, Sitting Balance unsupported;sitting edge of bed  -DT     Static Standing Balance supervision  -DT     Dynamic Standing Balance standby assist  -DT     Position/Device Used, Standing Balance unsupported  -DT               User Key  (r) = Recorded By, (t) = Taken By, (c) = Cosigned By      Initials Name Provider Type    DT Pascale Wilder, OT Occupational Therapist                   Goals/Plan    No documentation.                  Clinical Impression       Row Name 03/17/24 1229          Pain Assessment    Pretreatment Pain Rating 0/10 - no pain  -DT     Posttreatment Pain Rating 0/10 - no pain  -DT       Row Name 03/17/24 1229          Plan of Care Review    Plan of Care Reviewed With patient  -DT     Outcome Evaluation Pt is a 68 y.o. F adm with AMS, vomiting & diarrhea. Dx with PNA, COVID-19, UTI.   PMH: chronic back/neck pain, hyperlipidemia. At baseline pt lives with lives with spouse, daughter & grand-daughter In ranch style home with basement.  She does normally access basement as that is where the laundry is located. She also reports a recent fall on those stairs with reported hip pain. She is normally ind at all levels including driving. She states her family can assist PRN at home. Upon eval, pt is A&O X4. She required additional response time to answer  & some additional questions, but was able to follow all 1-2 step directives & provide PLOF. She completed all ADL transfers & LB dressing tasks independently with demo of  good balance, strength & posture. Nursing & pt report she has been amb to bathroom independently. At this time, pt seems to be functioning near her baseline status with mild dec endurance. She states she still is having trouble with s/s of diarrhea. Discussed safety prec in hospital setting & encouraged her to call for assistance PRN. Anticipate pt will be safe to return home with family once medically cleared. No further OT services are recommended at this time. OT will sign off at this time.  -DT       Row Name 24 1229          Therapy Assessment/Plan (OT)    Criteria for Skilled Therapeutic Interventions Met (OT) no;no problems identified which require skilled intervention  -DT     Therapy Frequency (OT) evaluation only  -DT       Row Name 24 1229          Therapy Plan Review/Discharge Plan (OT)    Anticipated Discharge Disposition (OT) home with assist  -DT       Row Name 24 1229          Positioning and Restraints    Pre-Treatment Position in bed  -DT     Post Treatment Position chair  -DT     In Chair notified nsg;sitting;call light within reach;encouraged to call for assist  -DT               User Key  (r) = Recorded By, (t) = Taken By, (c) = Cosigned By      Initials Name Provider Type    DT Pascale Wilder, OT Occupational Therapist                   Outcome Measures       Row Name 24 1200 24 0800       How much help from another person do you currently need...    Turning from your  back to your side while in flat bed without using bedrails? 4  -EL 4  -SN    Moving from lying on back to sitting on the side of a flat bed without bedrails? 4  -EL 4  -SN    Moving to and from a bed to a chair (including a wheelchair)? 4  -EL 4  -SN    Standing up from a chair using your arms (e.g., wheelchair, bedside chair)? 4  -EL 4  -SN    Climbing 3-5 steps with a railing? 3  -EL 4  -SN    To walk in hospital room? 4  -EL 4  -SN    AM-PAC 6 Clicks Score (PT) 23  -EL 24  -SN    Highest Level of Mobility Goal 7 --> Walk 25 feet or more  -EL 8 --> Walked 250 feet or more  -SN      Row Name 03/17/24 1200          Functional Assessment    Outcome Measure Options AM-PAC 6 Clicks Basic Mobility (PT)  -EL               User Key  (r) = Recorded By, (t) = Taken By, (c) = Cosigned By      Initials Name Provider Type    Jose Guadalupe Jefferson, PT Physical Therapist    Gloria Felipe LPN Licensed Nurse                    Occupational Therapy Education       Title: PT OT SLP Therapies (Done)       Topic: Occupational Therapy (Done)       Point: ADL training (Done)       Description:   Instruct learner(s) on proper safety adaptation and remediation techniques during self care or transfers.   Instruct in proper use of assistive devices.                  Learning Progress Summary             Patient Acceptance, E,TB, VU by DT at 3/17/2024 1231    Comment: Role of OT,goals & POC, safety prec in hospital setting                         Point: Precautions (Done)       Description:   Instruct learner(s) on prescribed precautions during self-care and functional transfers.                  Learning Progress Summary             Patient Acceptance, E,TB, VU by DT at 3/17/2024 1231    Comment: Role of OT,goals & POC, safety prec in hospital setting                                         User Key       Initials Effective Dates Name Provider Type Discipline    DT 07/11/23 -  Pascale Wilder, OT Occupational Therapist OT                   OT Recommendation and Plan  Therapy Frequency (OT): evaluation only  Plan of Care Review  Plan of Care Reviewed With: patient  Outcome Evaluation: Pt is a 68 y.o. F adm with AMS, vomiting & diarrhea. Dx with PNA, COVID-19, UTI.   PMH: chronic back/neck pain, hyperlipidemia. At baseline pt lives with lives with spouse, daughter & grand-daughter In ranch style home with basement. She does normally access basement as that is where the laundry is located. She also reports a recent fall on those stairs with reported hip pain. She is normally ind at all levels including driving. She states her family can assist PRN at home. Upon eval, pt is A&O X4. She required additional response time to answer  & some additional questions, but was able to follow all 1-2 step directives & provide PLOF. She completed all ADL transfers & LB dressing tasks independently with demo of  good balance, strength & posture. Nursing & pt report she has been amb to bathroom independently. At this time, pt seems to be functioning near her baseline status with mild dec endurance. She states she still is having trouble with s/s of diarrhea. Discussed safety prec in hospital setting & encouraged her to call for assistance PRN. Anticipate pt will be safe to return home with family once medically cleared. No further OT services are recommended at this time. OT will sign off at this time.     Time Calculation:         Time Calculation- OT       Row Name 24 1232             Time Calculation- OT    OT Start Time 1000  -DT      OT Stop Time 1027  -DT      OT Time Calculation (min) 27 min  -DT      OT Received On 24  -DT                User Key  (r) = Recorded By, (t) = Taken By, (c) = Cosigned By      Initials Name Provider Type    Pascale Pineda, OT Occupational Therapist                           Pascale Wilder, OT  3/17/2024

## 2024-03-18 VITALS
TEMPERATURE: 97.9 F | OXYGEN SATURATION: 96 % | HEIGHT: 61 IN | WEIGHT: 169.75 LBS | HEART RATE: 104 BPM | DIASTOLIC BLOOD PRESSURE: 85 MMHG | RESPIRATION RATE: 20 BRPM | SYSTOLIC BLOOD PRESSURE: 136 MMHG | BODY MASS INDEX: 32.05 KG/M2

## 2024-03-18 LAB
ALBUMIN SERPL-MCNC: 4.4 G/DL (ref 3.5–5.2)
ALBUMIN/GLOB SERPL: 1.7 G/DL
ALP SERPL-CCNC: 98 U/L (ref 39–117)
ALT SERPL W P-5'-P-CCNC: 18 U/L (ref 1–33)
ANION GAP SERPL CALCULATED.3IONS-SCNC: 14 MMOL/L (ref 5–15)
AST SERPL-CCNC: 22 U/L (ref 1–32)
BASOPHILS # BLD AUTO: 0.06 10*3/MM3 (ref 0–0.2)
BASOPHILS NFR BLD AUTO: 1.1 % (ref 0–1.5)
BILIRUB SERPL-MCNC: 0.4 MG/DL (ref 0–1.2)
BUN SERPL-MCNC: 9 MG/DL (ref 8–23)
BUN/CREAT SERPL: 10 (ref 7–25)
CALCIUM SPEC-SCNC: 9.2 MG/DL (ref 8.6–10.5)
CHLORIDE SERPL-SCNC: 101 MMOL/L (ref 98–107)
CO2 SERPL-SCNC: 23 MMOL/L (ref 22–29)
CREAT SERPL-MCNC: 0.9 MG/DL (ref 0.57–1)
DEPRECATED RDW RBC AUTO: 48.7 FL (ref 37–54)
EGFRCR SERPLBLD CKD-EPI 2021: 70.2 ML/MIN/1.73
EOSINOPHIL # BLD AUTO: 0.27 10*3/MM3 (ref 0–0.4)
EOSINOPHIL NFR BLD AUTO: 4.8 % (ref 0.3–6.2)
ERYTHROCYTE [DISTWIDTH] IN BLOOD BY AUTOMATED COUNT: 16.2 % (ref 12.3–15.4)
GLOBULIN UR ELPH-MCNC: 2.6 GM/DL
GLUCOSE SERPL-MCNC: 80 MG/DL (ref 65–99)
HCT VFR BLD AUTO: 42.5 % (ref 34–46.6)
HGB BLD-MCNC: 13.2 G/DL (ref 12–15.9)
IMM GRANULOCYTES # BLD AUTO: 0.02 10*3/MM3 (ref 0–0.05)
IMM GRANULOCYTES NFR BLD AUTO: 0.4 % (ref 0–0.5)
LYMPHOCYTES # BLD AUTO: 2.27 10*3/MM3 (ref 0.7–3.1)
LYMPHOCYTES NFR BLD AUTO: 40.2 % (ref 19.6–45.3)
MAGNESIUM SERPL-MCNC: 1.8 MG/DL (ref 1.6–2.4)
MCH RBC QN AUTO: 25.9 PG (ref 26.6–33)
MCHC RBC AUTO-ENTMCNC: 31.1 G/DL (ref 31.5–35.7)
MCV RBC AUTO: 83.3 FL (ref 79–97)
MONOCYTES # BLD AUTO: 0.46 10*3/MM3 (ref 0.1–0.9)
MONOCYTES NFR BLD AUTO: 8.1 % (ref 5–12)
NEUTROPHILS NFR BLD AUTO: 2.57 10*3/MM3 (ref 1.7–7)
NEUTROPHILS NFR BLD AUTO: 45.4 % (ref 42.7–76)
NRBC BLD AUTO-RTO: 0 /100 WBC (ref 0–0.2)
PHOSPHATE SERPL-MCNC: 4.2 MG/DL (ref 2.5–4.5)
PLATELET # BLD AUTO: 366 10*3/MM3 (ref 140–450)
PMV BLD AUTO: 9 FL (ref 6–12)
POTASSIUM SERPL-SCNC: 3.6 MMOL/L (ref 3.5–5.2)
PROT SERPL-MCNC: 7 G/DL (ref 6–8.5)
RBC # BLD AUTO: 5.1 10*6/MM3 (ref 3.77–5.28)
SODIUM SERPL-SCNC: 138 MMOL/L (ref 136–145)
WBC NRBC COR # BLD AUTO: 5.65 10*3/MM3 (ref 3.4–10.8)

## 2024-03-18 PROCEDURE — 93005 ELECTROCARDIOGRAM TRACING: CPT

## 2024-03-18 PROCEDURE — 63710000001 PROMETHAZINE PER 12.5 MG: Performed by: INTERNAL MEDICINE

## 2024-03-18 PROCEDURE — 84100 ASSAY OF PHOSPHORUS: CPT | Performed by: INTERNAL MEDICINE

## 2024-03-18 PROCEDURE — 80053 COMPREHEN METABOLIC PANEL: CPT | Performed by: INTERNAL MEDICINE

## 2024-03-18 PROCEDURE — 85025 COMPLETE CBC W/AUTO DIFF WBC: CPT | Performed by: INTERNAL MEDICINE

## 2024-03-18 PROCEDURE — 83735 ASSAY OF MAGNESIUM: CPT | Performed by: INTERNAL MEDICINE

## 2024-03-18 RX ORDER — ATORVASTATIN CALCIUM 40 MG/1
40 TABLET, FILM COATED ORAL DAILY
Qty: 90 TABLET | Refills: 0 | Status: SHIPPED | OUTPATIENT
Start: 2024-03-21

## 2024-03-18 RX ORDER — LEVOFLOXACIN 750 MG/1
750 TABLET, FILM COATED ORAL DAILY
Qty: 5 TABLET | Refills: 0 | Status: SHIPPED | OUTPATIENT
Start: 2024-03-18 | End: 2024-03-23

## 2024-03-18 RX ORDER — GUAIFENESIN 200 MG/10ML
200 LIQUID ORAL EVERY 4 HOURS PRN
Qty: 300 ML | Refills: 0 | Status: SHIPPED | OUTPATIENT
Start: 2024-03-18 | End: 2024-03-23

## 2024-03-18 RX ADMIN — PANTOPRAZOLE SODIUM 40 MG: 40 TABLET, DELAYED RELEASE ORAL at 08:08

## 2024-03-18 RX ADMIN — LIDOCAINE 1 PATCH: 4 PATCH TOPICAL at 09:07

## 2024-03-18 RX ADMIN — PROMETHAZINE HYDROCHLORIDE 12.5 MG: 12.5 TABLET ORAL at 05:51

## 2024-03-18 RX ADMIN — MILNACIPRAN HYDROCHLORIDE 100 MG: 25 TABLET, FILM COATED ORAL at 09:09

## 2024-03-18 RX ADMIN — NIRMATRELVIR AND RITONAVIR 2 TABLET: KIT at 09:09

## 2024-03-18 RX ADMIN — FUROSEMIDE 20 MG: 40 TABLET ORAL at 09:09

## 2024-03-18 RX ADMIN — BUPROPION HYDROCHLORIDE 450 MG: 150 TABLET, EXTENDED RELEASE ORAL at 09:09

## 2024-03-18 RX ADMIN — ACETAMINOPHEN 650 MG: 325 TABLET, FILM COATED ORAL at 03:48

## 2024-03-18 RX ADMIN — Medication 10 ML: at 09:09

## 2024-03-18 NOTE — CASE MANAGEMENT/SOCIAL WORK
Discharge Planning Assessment   [place]     Patient Name: Omaira Roque  MRN: [id number]  Today's Date: 3/18/2024    Admit Date: 3/16/2024    Plan: Return home with spouse and adult daughter   Discharge Needs Assessment       Row Name 03/18/24 1121       Living Environment    People in Home spouse;child(heidi), adult    Name(s) of People in Home spouse, Sammi and adult daughter    Current Living Arrangements home    Potentially Unsafe Housing Conditions none    In the past 12 months has the electric, gas, oil, or water company threatened to shut off services in your home? No    Primary Care Provided by self    Provides Primary Care For no one    Family Caregiver if Needed spouse    Quality of Family Relationships helpful;involved;supportive    Able to Return to Prior Arrangements yes       Resource/Environmental Concerns    Resource/Environmental Concerns none    Transportation Concerns none       Transportation Needs    In the past 12 months, has lack of transportation kept you from medical appointments or from getting medications? no    In the past 12 months, has lack of transportation kept you from meetings, work, or from getting things needed for daily living? No       Food Insecurity    Within the past 12 months, you worried that your food would run out before you got the money to buy more. Never true    Within the past 12 months, the food you bought just didn't last and you didn't have money to get more. Never true       Transition Planning    Patient/Family Anticipates Transition to home with family    Patient/Family Anticipated Services at Transition none    Transportation Anticipated car, drives self;family or friend will provide       Discharge Needs Assessment    Readmission Within the Last 30 Days no previous admission in last 30 days    Equipment Currently Used at Home none    Anticipated Changes Related to Illness none    Equipment Needed After Discharge none                   Discharge Plan       Row Name  03/18/24 1123       Plan    Plan Return home with spouse and adult daughter    Patient/Family in Agreement with Plan yes    Plan Comments Barriers: IV antibiotics. CM met with Mrs. Roque who is a/o. She reported she resides with her  and adult daughter. Her and her   usually help each  other and their daughter will be at home because she just lost her job.  She does not use any equipment. She denied financial difficulties.  CM verified PCP and Pharmacy. She will have transport home from her family                Demographic Summary       Row Name 03/18/24 1120       General Information    Admission Type inpatient    Arrived From emergency department    Required Notices Provided Important Message from Medicare    Referral Source admission list    Reason for Consult discharge planning    Preferred Language English       Contact Information    Permission Granted to Share Info With                    Functional Status       Row Name 03/18/24 1120       Functional Status    Usual Activity Tolerance good    Current Activity Tolerance moderate       Functional Status, IADL    Medications assistive equipment and person    Meal Preparation assistive person    Housekeeping assistive person    Laundry assistive person    Shopping assistive person    IADL Comments spouse and adult daughter help with self care and home duties                          Asha VAN,RN Case Manager  [facility]  Phone: Desk- [phone] cell- [phone]

## 2024-03-18 NOTE — DISCHARGE SUMMARY
"[facility] Medicine Services  Discharge Summary    Date of Service: 3/18/2024  Patient Name: Omaira Roque  : 1955  MRN: [id number]    Date of Admission: 3/16/2024  Discharge Diagnosis: pneumonia, AMS, COVID-19, UTI- ruled out  Date of Discharge:  3/18/2024  Primary Care Physician: Mando Leyva MD      Presenting Problem:   Acute UTI [N39.0]  Pneumonia of both lower lobes due to infectious organism [J18.9]  Altered mental status, unspecified altered mental status type [R41.82]  AMS (altered mental status) [R41.82]  COVID-19 [U07.1]    Active and Resolved Hospital Problems:  Active Hospital Problems    Diagnosis POA    **AMS (altered mental status) [R41.82] Yes      Resolved Hospital Problems   No resolved problems to display.         Hospital Course     HPI:  Per the H&P     Hospital Course:  Patient admitted due to altered mental status, CT of head normal. AMS likely secondary to infection. UA was abnormal, culture sent and negative. Patient had chest xray that showed \"Evidence for potential developing bibasilar pneumonia. Recommend correlation for signs or symptoms of acute infection and follow-up to ensure resolution.\" Due to patient allergies to penicillins and cefdinir, rocephin was started with close monitoring and tolerated well. Patient altered mental status has resolved at this time. Patient to be discharged home on PO antibiotics as well as paxlovid for COVID-19. Patient does not require supplemental oxygen.   PT/OT have evaluated patient and deemed patient safe to return home.    Patient is medically stable for discharge at this time.         DISCHARGE Follow Up Recommendations for labs and diagnostics: PCP in 1-2 weeks      Reasons For Change In Medications and Indications for New Medications:  Paxlovid for COVID19  Hold atorvastatin while taking Paxlovid  Guaifenesin for cough  Levaquin for pneumonia    Day of Discharge     Vital Signs:  Temp:  [97 °F (36.1 °C)-98.1 °F " (36.7 °C)] 98.1 °F (36.7 °C)  Heart Rate:  [] 104  Resp:  [16-19] 19  BP: (127-136)/(61-85) 136/85    Physical Exam:  Physical Exam  Constitutional:       Appearance: She is obese.   HENT:      Head: Normocephalic and atraumatic.      Nose: Nose normal.      Mouth/Throat:      Mouth: Mucous membranes are moist.      Pharynx: Oropharynx is clear.   Eyes:      Extraocular Movements: Extraocular movements intact.      Conjunctiva/sclera: Conjunctivae normal.      Pupils: Pupils are equal, round, and reactive to light.   Cardiovascular:      Rate and Rhythm: Normal rate and regular rhythm.      Pulses: Normal pulses.      Heart sounds: Normal heart sounds.   Pulmonary:      Effort: Pulmonary effort is normal.      Breath sounds: Normal breath sounds.   Abdominal:      General: Abdomen is flat. Bowel sounds are normal.      Palpations: Abdomen is soft.   Musculoskeletal:         General: Normal range of motion.      Cervical back: Normal range of motion and neck supple.   Skin:     General: Skin is warm and dry.   Neurological:      General: No focal deficit present.      Mental Status: She is alert and oriented to person, place, and time. Mental status is at baseline.   Psychiatric:         Mood and Affect: Mood normal.         Behavior: Behavior normal.         Thought Content: Thought content normal.         Judgment: Judgment normal.            Pertinent  and/or Most Recent Results     LAB RESULTS:      Lab 03/18/24  0202 03/17/24  0421 03/16/24  1603 03/16/24  1056 03/16/24  1050   WBC 5.65 6.85  --   --  7.12   HEMOGLOBIN 13.2 12.3  --   --  14.1   HEMATOCRIT 42.5 40.6  --   --  45.6   PLATELETS 366 377  --   --  464*   NEUTROS ABS 2.57 3.92  --   --  4.25   IMMATURE GRANS (ABS) 0.02 0.02  --   --  0.02   LYMPHS ABS 2.27 2.21  --   --  2.17   MONOS ABS 0.46 0.52  --   --  0.55   EOS ABS 0.27 0.11  --   --  0.07   MCV 83.3 83.2  --   --  83.2   CRP  --   --  <0.30  --   --    PROCALCITONIN  --   --  0.05  --    --    LACTATE  --   --   --  2.0  --    LDH  --   --  194  --   --          Lab 03/18/24  0202 03/17/24  1714 03/17/24  0421 03/16/24  1051 03/16/24  1050   SODIUM 138  --  140  --  139   POTASSIUM 3.6 3.5 3.2*  --  3.7   CHLORIDE 101  --  104  --  103   CO2 23.0  --  20.0*  --  17.0*   ANION GAP 14.0  --  16.0*  --  19.0*   BUN 9  --  13  --  17   CREATININE 0.90  --  0.98  --  0.90   EGFR 70.2  --  63.4  --  70.2   GLUCOSE 80  --  95  --  131*   CALCIUM 9.2  --  9.1  --  9.7   MAGNESIUM 1.8  --  1.8  --   --    PHOSPHORUS 4.2  --  4.1  --   --    TSH  --   --   --  5.380*  --          Lab 03/18/24  0202 03/17/24  0421 03/16/24  1050   TOTAL PROTEIN 7.0 6.7 7.8   ALBUMIN 4.4 3.9 4.6   GLOBULIN 2.6 2.8 3.2   ALT (SGPT) 18 17 22   AST (SGOT) 22 22 27   BILIRUBIN 0.4 0.6 0.7   ALK PHOS 98 99 114         Lab 03/16/24  1051   HSTROP T <6                 Brief Urine Lab Results  (Last result in the past 365 days)        Color   Clarity   Blood   Leuk Est   Nitrite   Protein   CREAT   Urine HCG        03/16/24 1121 Dark Yellow   Turbid   Negative   Trace   Negative   Trace                 Microbiology Results (last 10 days)       Procedure Component Value - Date/Time    Legionella Antigen, Urine - Urine, Urine, Clean Catch [[id number]]  (Normal) Collected: 03/16/24 1903    Lab Status: Final result Specimen: Urine, Clean Catch Updated: 03/16/24 1930     LEGIONELLA ANTIGEN, URINE Negative    S. Pneumo Ag Urine or CSF - Urine, Urine, Clean Catch [[id number]]  (Normal) Collected: 03/16/24 1903    Lab Status: Final result Specimen: Urine, Clean Catch Updated: 03/16/24 1930     Strep Pneumo Ag Negative    MRSA Screen, PCR (Inpatient) - Swab, Nares [[id number]]  (Normal) Collected: 03/16/24 6163    Lab Status: Final result Specimen: Swab from Nares Updated: 03/16/24 1720     MRSA PCR No MRSA Detected    Narrative:      The negative predictive value of this diagnostic test is high and should only be used to consider de-escalating  anti-MRSA therapy. A positive result may indicate colonization with MRSA and must be correlated clinically.    Urine Culture - Urine, Urine, Catheter [[id number]]  (Normal) Collected: 03/16/24 1121    Lab Status: Final result Specimen: Urine, Catheter Updated: 03/17/24 2004     Urine Culture No growth    Blood Culture - Blood, Arm, Left [[id number]]  (Normal) Collected: 03/16/24 1051    Lab Status: Preliminary result Specimen: Blood from Arm, Left Updated: 03/18/24 1101     Blood Culture No growth at 2 days    COVID-19, FLU A/B, RSV PCR 1 HR TAT - Swab, Nasopharynx [[id number]]  (Abnormal) Collected: 03/16/24 1051    Lab Status: Final result Specimen: Swab from Nasopharynx Updated: 03/16/24 1148     COVID19 Detected     Influenza A PCR Not Detected     Influenza B PCR Not Detected     RSV, PCR Not Detected    Narrative:      Fact sheet for providers: https://www.fda.gov/media/058849/download    Fact sheet for patients: https://www.fda.gov/media/922930/download    Test performed by PCR.    Blood Culture - Blood, Arm, Right [[id number]]  (Normal) Collected: 03/16/24 1050    Lab Status: Preliminary result Specimen: Blood from Arm, Right Updated: 03/18/24 1101     Blood Culture No growth at 2 days    Narrative:      Less than seven (7) mL's of blood was collected.  Insufficient quantity may yield false negative results.            CT Head Without Contrast    Result Date: 3/16/2024  Impression: Impression: No acute intracranial abnormality Electronically Signed: Dexter Pino MD  3/16/2024 11:43 AM EDT  Workstation ID: [id number]    XR Chest 1 View    Result Date: 3/16/2024  Impression: Impression: Evidence for potential developing bibasilar pneumonia. Recommend correlation for signs or symptoms of acute infection and follow-up to ensure resolution. Electronically Signed: Jace Sidhu MD  3/16/2024 11:11 AM EDT  Workstation ID: [id number]                 Labs Pending at Discharge:  Pending Labs       Order Current Status     Blood Culture - Blood, Arm, Left Preliminary result    Blood Culture - Blood, Arm, Right Preliminary result            Procedures Performed           Consults:   Consults       Date and Time Order Name Status Description    3/16/2024  2:12 PM Hospitalist (on-call MD unless specified)                Discharge Details        Discharge Medications        New Medications        Instructions Start Date   guaifenesin 100 MG/5ML liquid  Commonly known as: ROBITUSSIN   200 mg, Oral, Every 4 Hours PRN      levoFLOXacin 750 MG tablet  Commonly known as: Levaquin   750 mg, Oral, Daily      Nirmatrelvir&Ritonavir 150/100 10 x 150 MG & 10 x 100MG tablet therapy pack tablet (for renal adjustment)  Commonly known as: PAXLOVID   2 tablets, Oral, 2 Times Daily             Changes to Medications        Instructions Start Date   atorvastatin 40 MG tablet  Commonly known as: Lipitor  What changed: These instructions start on March 21, 2024. If you are unsure what to do until then, ask your doctor or other care provider.   40 mg, Oral, Daily   Start Date: March 21, 2024            Continue These Medications        Instructions Start Date   [place] Thyroid 90 MG tablet  Generic drug: Thyroid   90 mg, Oral      Breo Ellipta 200-25 MCG/INH inhaler  Generic drug: Fluticasone Furoate-Vilanterol   1 puff, Inhalation, Daily      buPROPion  MG 24 hr tablet  Commonly known as: FORFIVO XL   450 mg, Oral, Daily      cyanocobalamin 1000 MCG/ML injection   1,000 mcg, Intramuscular, Every 7 Days      Dexilant 60 MG capsule  Generic drug: dexlansoprazole   TAKE 1 CAPSULE BY MOUTH EVERY DAY      dicyclomine 10 MG capsule  Commonly known as: BENTYL   10 mg, Oral, 4 Times Daily Before Meals & Nightly      furosemide 20 MG tablet  Commonly known as: LASIX   TAKE 1 TABLET BY MOUTH EVERY DAY      galcanezumab-gnlm 120 MG/ML auto-injector pen  Commonly known as: EMGALITY   120 mg, Subcutaneous, Once      potassium chloride 20 MEQ CR tablet  Commonly  known as: KLOR-CON M20   20 mEq, Oral, 2 Times Daily      pregabalin 300 MG capsule  Commonly known as: LYRICA   300 mg, Oral, 2 Times Daily      promethazine 25 MG tablet  Commonly known as: PHENERGAN   Every 6 Hours      propranolol  MG 24 hr capsule  Commonly known as: INDERAL LA   120 mg, Oral, Daily      Savella 100 MG tablet  Generic drug: Milnacipran HCl   1 tablet, Oral, Every 12 Hours Scheduled      Ubrelvy 100 MG tablet  Generic drug: ubrogepant   See Admin Instructions, TAKE 1 TABLET BY MOUTH AS NEEDED FOR MIGRAINE IF NEEDED THEN TAKE 2ND DOSE AT LEAST 2 HOURS AFTER FIRST DOSE *MAX 2 TABS/24 HOURS*      verapamil  MG 24 hr capsule  Commonly known as: VERELAN   TAKE 1 CAPSULE BY MOUTH EVERY DAY               Allergies   Allergen Reactions    Ace Inhibitors Unknown (See Comments) and Other (See Comments)    Amitriptyline Hcl Unknown (See Comments) and Other (See Comments)    Amoxicillin Anaphylaxis    Carbamazepine Unknown (See Comments) and Other (See Comments)    Cefdinir Shortness Of Breath    Metronidazole Nausea Only    Morphine Palpitations, Other (See Comments) and Unknown (See Comments)    Penicillins Anaphylaxis    Pregabalin Other (See Comments)    Synthroid  [Levothyroxine Sodium] Rash         Discharge Disposition:   Home or Self Care    Diet:  Hospital:  Diet Order   Procedures    Diet: Regular/House; Fluid Consistency: Thin (IDDSI 0)         Discharge Activity:         CODE STATUS:  Code Status and Medical Interventions:   Ordered at: 03/16/24 6125     Level Of Support Discussed With:    Patient     Code Status (Patient has no pulse and is not breathing):    CPR (Attempt to Resuscitate)     Medical Interventions (Patient has pulse or is breathing):    Full Support         Future Appointments   Date Time Provider Department [place]   3/26/2024  8:50 AM David Vallejo DO [facility]   4/2/2024  9:00 AM [facility]  [facility]   4/2/2024 10:00 AM [facility]  [facility]        Additional Instructions for the Follow-ups that You Need to Schedule       Discharge Follow-up with PCP   As directed       Currently Documented PCP:    Mando Leyva MD    PCP Phone Number:    [phone]     Follow Up Details: 1-2 weeks                Time spent on Discharge including face to face service:  >30 minutes    Signature: Electronically signed by CAT Peralta, 03/18/24, 12:05 EDT.  [facility] Hospitalist Team

## 2024-03-18 NOTE — PLAN OF CARE
Goal Outcome Evaluation:  Patient alert and oriented. VSS. Call light in place. No new needs at this time.

## 2024-03-19 LAB
QT INTERVAL: 343 MS
QTC INTERVAL: 474 MS

## 2024-03-20 NOTE — PROGRESS NOTES
"Enter Query Response Below      Query Response: bacterial pna not specified             If applicable, please update the problem list.   Patient: Omaira Roque        : 1955  Account: [id number]           Admit Date: 3/16/2024        How to Respond to this query:       a. Click New Note     b. Answer query within the yellow box.                c. Update the Problem List, if applicable.      If you have any questions about this query contact me at: [email or web address]     Dr. Elliott,    67yo presents with a 5-day history of AMS. Patient found to have \"UTI/community-acquired pneumonia/COVID infection\", per the H&P. 3/16 chest x-ray with \"Evidence for potential developing bibasilar pneumonia\".  Patient with the following labs:  WBC 7.12, 6.85, 5.65  Patient with following labs on admission:  Temperature 98.5  Respiratory rate 24  Heart rate 114    The patient received AZACTAM, vancomycin and PAXLOVID  Discharge summary with documentation of \"Evidence for potential developing bibasilar pneumonia\". Patient sent home on Levaquin.    Please clarify the type of pneumonia the patient was treated/monitored for:  -Bacterial pneumonia unspecified  -Other- specify______  -Unable to determine    By submitting this query, we are merely seeking further clarification of documentation to accurately reflect all conditions that you are monitoring, evaluating, treating or that extend the hospitalization or utilize additional resources of care. Please utilize your independent clinical judgment when addressing the question(s) above.     This query and your response, once completed, will be entered into the legal medical record.    Sincerely,  Olimpia Quinones RN, BSN  Clinical Documentation Integrity Program   "

## 2024-03-20 NOTE — PROGRESS NOTES
"Enter Query Response Below      Query Response: -Metabolic encephalopathy              If applicable, please update the problem list.   Patient: Omaira Roque        : 1955  Account: [id number]           Admit Date: 3/16/2024        How to Respond to this query:       a. Click New Note     b. Answer query within the yellow box.                c. Update the Problem List, if applicable.      If you have any questions about this query contact me at: [email or web address]     ,    67 yo presents with \"Altered mental status secondary to infection related encephalopathy from UTI/community-acquired pneumonia/COVID infection. Patient presented with altered mental status for 5 days prior to admission.  She is unable to answer the question appropriately\", per the H&P. Patient with a [place] Coma Score of 14 (3/16 1031), 13 (3/16 1201) and returned to normal of 15 (3/16 2000). The patient received AZACTAM, vancomycin and PAXLOVID.    Please clarify if patient treated/monitored for one or more of the following:  -Metabolic encephalopathy  -Other- specify__________  -Unable to determine    By submitting this query, we are merely seeking further clarification of documentation to accurately reflect all conditions that you are monitoring, evaluating, treating or that extend the hospitalization or utilize additional resources of care. Please utilize your independent clinical judgment when addressing the question(s) above.     This query and your response, once completed, will be entered into the legal medical record.    Sincerely,  Olimpia Quinones RN, BSN  Clinical Documentation Integrity Program   "

## 2024-03-21 LAB
BACTERIA SPEC AEROBE CULT: NORMAL
BACTERIA SPEC AEROBE CULT: NORMAL

## 2024-04-02 ENCOUNTER — HOSPITAL ENCOUNTER (OUTPATIENT)
Dept: MRI IMAGING | Facility: HOSPITAL | Age: 69
Discharge: HOME | End: 2024-04-02
Payer: MEDICARE

## 2025-06-12 ENCOUNTER — TRANSCRIBE ORDERS (OUTPATIENT)
Dept: ADMINISTRATIVE | Facility: HOSPITAL | Age: 70
End: 2025-06-12
Payer: MEDICARE

## 2025-06-12 DIAGNOSIS — Z12.31 SCREENING MAMMOGRAM, ENCOUNTER FOR: Primary | ICD-10-CM

## 2025-06-16 LAB
NCCN CRITERIA FLAG: NORMAL
TYRER CUZICK SCORE: 3.1

## 2025-06-24 ENCOUNTER — HOSPITAL ENCOUNTER (OUTPATIENT)
Dept: MAMMOGRAPHY | Facility: HOSPITAL | Age: 70
Discharge: HOME | End: 2025-06-24
Admitting: FAMILY MEDICINE
Payer: MEDICARE

## 2025-06-24 DIAGNOSIS — Z12.31 SCREENING MAMMOGRAM, ENCOUNTER FOR: ICD-10-CM

## 2025-06-24 PROCEDURE — 77063 BREAST TOMOSYNTHESIS BI: CPT

## 2025-06-24 PROCEDURE — 77067 SCR MAMMO BI INCL CAD: CPT
